# Patient Record
Sex: FEMALE | Race: WHITE | Employment: FULL TIME | URBAN - METROPOLITAN AREA
[De-identification: names, ages, dates, MRNs, and addresses within clinical notes are randomized per-mention and may not be internally consistent; named-entity substitution may affect disease eponyms.]

---

## 2017-09-29 ENCOUNTER — OFFICE VISIT (OUTPATIENT)
Dept: FAMILY MEDICINE CLINIC | Age: 32
End: 2017-09-29

## 2017-09-29 VITALS
DIASTOLIC BLOOD PRESSURE: 70 MMHG | WEIGHT: 179.9 LBS | HEART RATE: 112 BPM | BODY MASS INDEX: 29.97 KG/M2 | OXYGEN SATURATION: 99 % | HEIGHT: 65 IN | SYSTOLIC BLOOD PRESSURE: 110 MMHG

## 2017-09-29 DIAGNOSIS — J45.909 REACTIVE AIRWAY DISEASE WITHOUT COMPLICATION: ICD-10-CM

## 2017-09-29 DIAGNOSIS — Z00.00 WELL ADULT EXAM: Primary | ICD-10-CM

## 2017-09-29 PROCEDURE — 99395 PREV VISIT EST AGE 18-39: CPT | Performed by: FAMILY MEDICINE

## 2017-09-29 RX ORDER — ALBUTEROL SULFATE 2.5 MG/3ML
2.5 SOLUTION RESPIRATORY (INHALATION)
COMMUNITY
Start: 2015-02-17

## 2017-09-29 RX ORDER — ALBUTEROL SULFATE 90 UG/1
2-4 AEROSOL, METERED RESPIRATORY (INHALATION)
COMMUNITY
Start: 2016-07-06

## 2017-09-29 ASSESSMENT — PATIENT HEALTH QUESTIONNAIRE - PHQ9
SUM OF ALL RESPONSES TO PHQ9 QUESTIONS 1 & 2: 0
1. LITTLE INTEREST OR PLEASURE IN DOING THINGS: 0
SUM OF ALL RESPONSES TO PHQ QUESTIONS 1-9: 0
2. FEELING DOWN, DEPRESSED OR HOPELESS: 0

## 2017-10-07 ENCOUNTER — HOSPITAL ENCOUNTER (OUTPATIENT)
Dept: OTHER | Age: 32
Discharge: HOME | End: 2017-10-07
Attending: FAMILY MEDICINE | Admitting: FAMILY MEDICINE

## 2017-10-07 LAB
A/G RATIO: 1.5 (ref 1.1–2.2)
ALBUMIN SERPL-MCNC: 4.1 G/DL (ref 3.4–5)
ALP BLD-CCNC: 50 U/L (ref 40–129)
ALT SERPL-CCNC: 8 U/L (ref 10–40)
ANION GAP SERPL CALCULATED.3IONS-SCNC: 14 MMOL/L (ref 3–16)
AST SERPL-CCNC: 14 U/L (ref 15–37)
BASOPHILS ABSOLUTE: 0.1 K/UL (ref 0–0.2)
BASOPHILS RELATIVE PERCENT: 1 %
BILIRUB SERPL-MCNC: 0.3 MG/DL (ref 0–1)
BUN BLDV-MCNC: 12 MG/DL (ref 7–20)
CALCIUM SERPL-MCNC: 9 MG/DL (ref 8.3–10.6)
CHLORIDE BLD-SCNC: 105 MMOL/L (ref 99–110)
CHOLESTEROL, TOTAL: 190 MG/DL (ref 0–199)
CO2: 23 MMOL/L (ref 21–32)
CREAT SERPL-MCNC: 0.8 MG/DL (ref 0.6–1.1)
EOSINOPHILS ABSOLUTE: 0.5 K/UL (ref 0–0.6)
EOSINOPHILS RELATIVE PERCENT: 9.4 %
GFR AFRICAN AMERICAN: >60
GFR NON-AFRICAN AMERICAN: >60
GLOBULIN: 2.7 G/DL
GLUCOSE BLD-MCNC: 85 MG/DL (ref 70–99)
HCT VFR BLD CALC: 34.3 % (ref 36–48)
HDLC SERPL-MCNC: 75 MG/DL (ref 40–60)
HEMOGLOBIN: 11.5 G/DL (ref 12–16)
LDL CHOLESTEROL CALCULATED: 101 MG/DL
LYMPHOCYTES ABSOLUTE: 2 K/UL (ref 1–5.1)
LYMPHOCYTES RELATIVE PERCENT: 36.5 %
MCH RBC QN AUTO: 29.8 PG (ref 26–34)
MCHC RBC AUTO-ENTMCNC: 33.5 G/DL (ref 31–36)
MCV RBC AUTO: 89.1 FL (ref 80–100)
MONOCYTES ABSOLUTE: 0.4 K/UL (ref 0–1.3)
MONOCYTES RELATIVE PERCENT: 6.7 %
NEUTROPHILS ABSOLUTE: 2.5 K/UL (ref 1.7–7.7)
NEUTROPHILS RELATIVE PERCENT: 46.4 %
PDW BLD-RTO: 14.6 % (ref 12.4–15.4)
PLATELET # BLD: 496 K/UL (ref 135–450)
PMV BLD AUTO: 7.5 FL (ref 5–10.5)
POTASSIUM SERPL-SCNC: 4.6 MMOL/L (ref 3.5–5.1)
RBC # BLD: 3.86 M/UL (ref 4–5.2)
SODIUM BLD-SCNC: 142 MMOL/L (ref 136–145)
TOTAL PROTEIN: 6.8 G/DL (ref 6.4–8.2)
TRIGL SERPL-MCNC: 71 MG/DL (ref 0–150)
TSH SERPL DL<=0.05 MIU/L-ACNC: 2.58 UIU/ML (ref 0.27–4.2)
VLDLC SERPL CALC-MCNC: 14 MG/DL
WBC # BLD: 5.4 K/UL (ref 4–11)

## 2017-12-07 ENCOUNTER — TELEPHONE (OUTPATIENT)
Dept: FAMILY MEDICINE CLINIC | Age: 32
End: 2017-12-07

## 2017-12-13 ENCOUNTER — OFFICE VISIT (OUTPATIENT)
Dept: FAMILY MEDICINE CLINIC | Age: 32
End: 2017-12-13

## 2017-12-13 VITALS
BODY MASS INDEX: 30.74 KG/M2 | SYSTOLIC BLOOD PRESSURE: 110 MMHG | OXYGEN SATURATION: 98 % | DIASTOLIC BLOOD PRESSURE: 70 MMHG | WEIGHT: 184.5 LBS | HEIGHT: 65 IN | HEART RATE: 122 BPM

## 2017-12-13 DIAGNOSIS — M54.2 NECK PAIN: ICD-10-CM

## 2017-12-13 DIAGNOSIS — D75.839 THROMBOCYTOSIS: Primary | ICD-10-CM

## 2017-12-13 DIAGNOSIS — R51.9 SEVERE HEADACHE: ICD-10-CM

## 2017-12-13 PROCEDURE — 99213 OFFICE O/P EST LOW 20 MIN: CPT | Performed by: FAMILY MEDICINE

## 2017-12-13 RX ORDER — CLONAZEPAM 0.5 MG/1
0.5 TABLET ORAL
COMMUNITY
Start: 2017-05-26 | End: 2019-06-25

## 2017-12-13 ASSESSMENT — ENCOUNTER SYMPTOMS
SINUS PRESSURE: 0
BACK PAIN: 0
VISUAL CHANGE: 0
COUGH: 0
TROUBLE SWALLOWING: 0
BLURRED VISION: 0
FACIAL SWEATING: 0
NAUSEA: 0
SORE THROAT: 0
EYE PAIN: 0
ABDOMINAL PAIN: 0
RHINORRHEA: 0
EYE REDNESS: 0
SCALP TENDERNESS: 0
VOMITING: 0
SWOLLEN GLANDS: 0
PHOTOPHOBIA: 0
EYE WATERING: 0

## 2017-12-13 NOTE — PROGRESS NOTES
relief. Review of Systems   Constitutional: Negative for fever and weight loss. HENT: Negative for ear pain, hearing loss, rhinorrhea, sinus pressure, sore throat, tinnitus and trouble swallowing. Eyes: Negative for blurred vision, photophobia, pain and redness. Respiratory: Negative for cough. Cardiovascular: Negative for chest pain and syncope. Gastrointestinal: Negative for abdominal pain, nausea and vomiting. Musculoskeletal: Positive for neck pain. Negative for back pain. Neurological: Positive for headaches. Negative for dizziness, tingling, seizures, weakness, numbness and loss of balance. Psychiatric/Behavioral: The patient does not have insomnia. Objective:   Physical Exam   Constitutional: She is oriented to person, place, and time. She appears well-developed and well-nourished. No distress. HENT:   Mouth/Throat: Oropharynx is clear and moist.   Eyes: Conjunctivae are normal. Pupils are equal, round, and reactive to light. Neck: No JVD present. No tracheal deviation present. No thyromegaly present. Cardiovascular: Normal rate, regular rhythm, normal heart sounds and intact distal pulses. Exam reveals no gallop. No murmur heard. Pulmonary/Chest: Effort normal and breath sounds normal. No stridor. No respiratory distress. She has no wheezes. She has no rales. She exhibits no tenderness. Abdominal: Soft. Bowel sounds are normal. She exhibits no distension and no mass. There is no tenderness. Musculoskeletal: She exhibits no edema or tenderness. Lymphadenopathy:     She has no cervical adenopathy. Neurological: She is alert and oriented to person, place, and time. She displays normal reflexes. No cranial nerve deficit. She exhibits normal muscle tone. Coordination normal.   Skin: Skin is warm and dry. No rash noted. No erythema. No pallor. Psychiatric: She has a normal mood and affect.  Her behavior is normal. Judgment and thought content normal.   Vitals

## 2017-12-14 ENCOUNTER — HOSPITAL ENCOUNTER (OUTPATIENT)
Dept: OTHER | Age: 32
Discharge: HOME | End: 2017-12-14
Attending: FAMILY MEDICINE | Admitting: FAMILY MEDICINE

## 2017-12-14 DIAGNOSIS — R51.9 SEVERE HEADACHE: ICD-10-CM

## 2017-12-14 DIAGNOSIS — M54.2 NECK PAIN: ICD-10-CM

## 2017-12-14 LAB
BASOPHILS ABSOLUTE: 0.1 K/UL (ref 0–0.2)
BASOPHILS RELATIVE PERCENT: 1 %
EOSINOPHILS ABSOLUTE: 0.7 K/UL (ref 0–0.6)
EOSINOPHILS RELATIVE PERCENT: 8.4 %
HCT VFR BLD CALC: 36.5 % (ref 36–48)
HEMOGLOBIN: 12 G/DL (ref 12–16)
LYMPHOCYTES ABSOLUTE: 2.5 K/UL (ref 1–5.1)
LYMPHOCYTES RELATIVE PERCENT: 27.8 %
MCH RBC QN AUTO: 27.3 PG (ref 26–34)
MCHC RBC AUTO-ENTMCNC: 32.8 G/DL (ref 31–36)
MCV RBC AUTO: 83.4 FL (ref 80–100)
MONOCYTES ABSOLUTE: 0.6 K/UL (ref 0–1.3)
MONOCYTES RELATIVE PERCENT: 6.5 %
NEUTROPHILS ABSOLUTE: 5 K/UL (ref 1.7–7.7)
NEUTROPHILS RELATIVE PERCENT: 56.3 %
PDW BLD-RTO: 14.9 % (ref 12.4–15.4)
PLATELET # BLD: 525 K/UL (ref 135–450)
PMV BLD AUTO: 7.8 FL (ref 5–10.5)
RBC # BLD: 4.38 M/UL (ref 4–5.2)
WBC # BLD: 8.9 K/UL (ref 4–11)

## 2018-03-09 ENCOUNTER — EMPLOYEE WELLNESS (OUTPATIENT)
Dept: OTHER | Age: 33
End: 2018-03-09

## 2018-04-12 PROBLEM — Z00.00 WELL ADULT EXAM: Status: RESOLVED | Noted: 2017-09-29 | Resolved: 2018-04-12

## 2018-04-16 VITALS — BODY MASS INDEX: 30.62 KG/M2 | WEIGHT: 184 LBS

## 2018-05-18 ENCOUNTER — OFFICE VISIT (OUTPATIENT)
Dept: FAMILY MEDICINE CLINIC | Age: 33
End: 2018-05-18

## 2018-05-18 VITALS
WEIGHT: 186.1 LBS | BODY MASS INDEX: 31 KG/M2 | DIASTOLIC BLOOD PRESSURE: 80 MMHG | OXYGEN SATURATION: 98 % | HEIGHT: 65 IN | HEART RATE: 113 BPM | SYSTOLIC BLOOD PRESSURE: 110 MMHG

## 2018-05-18 DIAGNOSIS — M54.2 NECK PAIN: ICD-10-CM

## 2018-05-18 DIAGNOSIS — E55.9 VITAMIN D DEFICIENCY: Primary | ICD-10-CM

## 2018-05-18 DIAGNOSIS — R59.1 LYMPHADENOPATHY OF HEAD AND NECK: ICD-10-CM

## 2018-05-18 DIAGNOSIS — D75.839 THROMBOCYTOSIS: ICD-10-CM

## 2018-05-18 DIAGNOSIS — R51.9 SEVERE HEADACHE: ICD-10-CM

## 2018-05-18 DIAGNOSIS — R20.0 BILATERAL HAND NUMBNESS: ICD-10-CM

## 2018-05-18 PROCEDURE — G8427 DOCREV CUR MEDS BY ELIG CLIN: HCPCS | Performed by: FAMILY MEDICINE

## 2018-05-18 PROCEDURE — G8417 CALC BMI ABV UP PARAM F/U: HCPCS | Performed by: FAMILY MEDICINE

## 2018-05-18 PROCEDURE — 1036F TOBACCO NON-USER: CPT | Performed by: FAMILY MEDICINE

## 2018-05-18 PROCEDURE — 99214 OFFICE O/P EST MOD 30 MIN: CPT | Performed by: FAMILY MEDICINE

## 2018-05-18 RX ORDER — NAPROXEN 500 MG/1
500 TABLET ORAL 2 TIMES DAILY WITH MEALS
COMMUNITY
End: 2019-08-05 | Stop reason: CLARIF

## 2018-05-18 ASSESSMENT — ENCOUNTER SYMPTOMS
TROUBLE SWALLOWING: 0
VISUAL CHANGE: 0
PHOTOPHOBIA: 0

## 2018-05-19 ENCOUNTER — HOSPITAL ENCOUNTER (OUTPATIENT)
Dept: OTHER | Age: 33
Discharge: HOME | End: 2018-05-19
Attending: FAMILY MEDICINE | Admitting: FAMILY MEDICINE

## 2018-05-19 LAB
A/G RATIO: 1.7 (ref 1.1–2.2)
ALBUMIN SERPL-MCNC: 4.3 G/DL (ref 3.4–5)
ALP BLD-CCNC: 48 U/L (ref 40–129)
ALT SERPL-CCNC: 10 U/L (ref 10–40)
ANION GAP SERPL CALCULATED.3IONS-SCNC: 14 MMOL/L (ref 3–16)
AST SERPL-CCNC: 17 U/L (ref 15–37)
BASOPHILS ABSOLUTE: 0.1 K/UL (ref 0–0.2)
BASOPHILS RELATIVE PERCENT: 1.5 %
BILIRUB SERPL-MCNC: 0.3 MG/DL (ref 0–1)
BUN BLDV-MCNC: 11 MG/DL (ref 7–20)
CALCIUM SERPL-MCNC: 9 MG/DL (ref 8.3–10.6)
CHLORIDE BLD-SCNC: 104 MMOL/L (ref 99–110)
CO2: 22 MMOL/L (ref 21–32)
CREAT SERPL-MCNC: 0.8 MG/DL (ref 0.6–1.1)
EOSINOPHILS ABSOLUTE: 0.6 K/UL (ref 0–0.6)
EOSINOPHILS RELATIVE PERCENT: 7.9 %
GFR AFRICAN AMERICAN: >60
GFR NON-AFRICAN AMERICAN: >60
GLOBULIN: 2.6 G/DL
GLUCOSE BLD-MCNC: 89 MG/DL (ref 70–99)
HCT VFR BLD CALC: 38.9 % (ref 36–48)
HEMOGLOBIN: 13.2 G/DL (ref 12–16)
LYMPHOCYTES ABSOLUTE: 2 K/UL (ref 1–5.1)
LYMPHOCYTES RELATIVE PERCENT: 27 %
MCH RBC QN AUTO: 28.3 PG (ref 26–34)
MCHC RBC AUTO-ENTMCNC: 33.8 G/DL (ref 31–36)
MCV RBC AUTO: 83.8 FL (ref 80–100)
MONOCYTES ABSOLUTE: 0.4 K/UL (ref 0–1.3)
MONOCYTES RELATIVE PERCENT: 5.9 %
NEUTROPHILS ABSOLUTE: 4.4 K/UL (ref 1.7–7.7)
NEUTROPHILS RELATIVE PERCENT: 57.7 %
PDW BLD-RTO: 15.4 % (ref 12.4–15.4)
PLATELET # BLD: 461 K/UL (ref 135–450)
PMV BLD AUTO: 7.7 FL (ref 5–10.5)
POTASSIUM SERPL-SCNC: 4.3 MMOL/L (ref 3.5–5.1)
RBC # BLD: 4.65 M/UL (ref 4–5.2)
SODIUM BLD-SCNC: 140 MMOL/L (ref 136–145)
TOTAL PROTEIN: 6.9 G/DL (ref 6.4–8.2)
TSH SERPL DL<=0.05 MIU/L-ACNC: 2.7 UIU/ML (ref 0.27–4.2)
VITAMIN D 25-HYDROXY: 21 NG/ML
WBC # BLD: 7.6 K/UL (ref 4–11)

## 2018-05-22 DIAGNOSIS — R51.9 SEVERE HEADACHE: ICD-10-CM

## 2018-05-22 DIAGNOSIS — M54.2 NECK PAIN: Primary | ICD-10-CM

## 2018-05-22 DIAGNOSIS — R20.0 BILATERAL HAND NUMBNESS: ICD-10-CM

## 2018-05-30 ENCOUNTER — HOSPITAL ENCOUNTER (OUTPATIENT)
Dept: MRI IMAGING | Age: 33
Discharge: HOME | End: 2018-05-30

## 2018-05-30 DIAGNOSIS — R51.9 SEVERE HEADACHE: ICD-10-CM

## 2018-05-30 DIAGNOSIS — M54.2 NECK PAIN: ICD-10-CM

## 2018-05-30 DIAGNOSIS — R20.0 BILATERAL HAND NUMBNESS: ICD-10-CM

## 2018-07-03 ENCOUNTER — OFFICE VISIT (OUTPATIENT)
Dept: NEUROLOGY | Age: 33
End: 2018-07-03

## 2018-07-03 VITALS
WEIGHT: 192 LBS | HEART RATE: 86 BPM | DIASTOLIC BLOOD PRESSURE: 80 MMHG | SYSTOLIC BLOOD PRESSURE: 116 MMHG | HEIGHT: 65 IN | BODY MASS INDEX: 31.99 KG/M2

## 2018-07-03 DIAGNOSIS — R20.0 BILATERAL HAND NUMBNESS: ICD-10-CM

## 2018-07-03 DIAGNOSIS — M54.81 CERVICO-OCCIPITAL NEURALGIA OF LEFT SIDE: Primary | ICD-10-CM

## 2018-07-03 PROCEDURE — G8417 CALC BMI ABV UP PARAM F/U: HCPCS | Performed by: PSYCHIATRY & NEUROLOGY

## 2018-07-03 PROCEDURE — G8427 DOCREV CUR MEDS BY ELIG CLIN: HCPCS | Performed by: PSYCHIATRY & NEUROLOGY

## 2018-07-03 PROCEDURE — 99244 OFF/OP CNSLTJ NEW/EST MOD 40: CPT | Performed by: PSYCHIATRY & NEUROLOGY

## 2018-07-03 RX ORDER — AMITRIPTYLINE HYDROCHLORIDE 10 MG/1
TABLET, FILM COATED ORAL
Qty: 60 TABLET | Refills: 0 | Status: SHIPPED | OUTPATIENT
Start: 2018-07-03 | End: 2018-08-02 | Stop reason: SDUPTHER

## 2018-07-03 NOTE — LETTER
[facility]  [address] [address]  Phone: [phone]  Fax: [phone]    Marlen Mccartney MD        July 3, 2018       Patient: Enriqueta Oconnellain   MR Number: [id number]   YOB: 1985   Date of Visit: 7/3/2018       Dear Dr. Bishop Joiner:    Thank you for the request for consultation for Penelope Ariza to me for the evaluation of Headaches and neck pain. Below are the relevant portions of my assessment and plan of care. NEUROLOGY CONSULTATION     Chief Complaint   Patient presents with    Headache     Patient is here today to establish care for severe headaches. Patient has been getting bad headaches since last October. HISTORY OF PRESENT ILLNESS :    Penelope Ariza is a 35 y.o. female who is referred by Dr. Bishop Joiner   History was obtained from patient And her . Patient was referred for evaluation of neck pain and headaches. Patient states that she has had them since last October 2017. She constantly feels a pressure in the back of the head especially around the occipital region on the left side. Sometimes the pain would come to the front also. Loud noise can sometimes make it feel worse. Patient now has constant dizziness over the last few weeks and feels unsteady  She also has nausea with severe attacks. Patient states that she has had migraine headaches in the past but these feel different. Patient also complains of tingling and numbness in both her arms. She keeps dropping objects from her hands.   MRI brain was done which showed the minimal ethmoid sinus disease but was otherwise normal.  MRI cervical spine was done which showed minimal foraminal narrowing but was essentially normal without any nerve impingement      REVIEW OF SYSTEMS    Constitutional:  []   Chills   [x]  Fatigue   []  Fevers   []  Malaise   []  Weight loss

## 2018-07-03 NOTE — PATIENT INSTRUCTIONS
Please call with any questions or concerns:   [facility]  @ [phone]. LAB RESULTS:  Please obtain any labs or diagnostic tests as discussed today. You should call the office to check the results. Please allow  3 to 7 days for us to get these results. MEDICATION LIST:  Please bring an accurate list of your medications to every visit. APPOINTMENT CONFIRMATION:  We will call you the day before your scheduled appointment to confirm. If we are unable to reach you, you MUST call back by the end of the day to confirm the appointment or we may be forced to cancel.

## 2018-07-03 NOTE — PROGRESS NOTES
above    Gastrointestinal:   []  Abdominal pain   []  Constipation    []  Diarrhea    []   Dysphagia                      [x] Denies all of the above    Genitourinary:      []  Frequency   []  Hematuria     []  Urinary incontinence           [x] Denies all of the above     Hematologic/lymphatic:  []  Bleeding    []  Easy bruising   [x]  Anemia  [] Denies all of the above     Musculoskeletal:   [x] Back pain       [x]  Myalgias    [x]  Neck pain           [] Denies all of the above    Neurological: As noted in HPI    Behavioral/Psych:   [x] Anxiety    []  Depression     []  Mood swings     [] Denies all of the above     Endocrine:   []  Temperature intolerance     [x] Fatigue      [] Denies all of the above     Allergic/Immunologic:   [] Hay fever    [x] Denies all of the above     Past Medical History:   Diagnosis Date    Reactive airway disease without complication 8/85/7234     Family History   Problem Relation Age of Onset    Depression Mother     Heart Disease Father     Stroke Father      Social History     Social History    Marital status: Legally      Spouse name: N/A    Number of children: 3    Years of education: N/A     Occupational History    [employer]--pre [employer]      Social History Main Topics    Smoking status: Never Smoker    Smokeless tobacco: Never Used    Alcohol use 0.6 oz/week     1 Glasses of wine per week    Drug use: No    Sexual activity: Yes     Partners: Male     Other Topics Concern    None     Social History Narrative    --raising 3 by self--ex helps    Gym 4 x week    Works 40--       PHYSICAL EXAMINATION:  /80   Pulse 86   Ht 5' 5\" (1.651 m)   Wt 192 lb (87.1 kg)   BMI 31.95 kg/m²   Appearance: Well appearing, well nourished and in no distress  Mental Status Exam: Patient is alert, oriented to person, place and time.    Recent and remote memory is normal  Fund of Knowledge is normal  Attention/concentration is normal.   Speech : No dysarthria  Language have mononeuropathy in both arms causing the tingling numbness in her hands  MRI brain images were normal  MRI cervical spine showed minimal bone spurring which is probably not significant        RECOMMENDATIONS :  Discussed with patient and her   Trial of amitriptyline at a low-dose  Side effects including sedation were discussed  I will see her back in a month and at that time do EMG and nerve conduction studies of both upper extremities to look for any mononeuropathy  Thank you for this consultation        Please note a portion of this chart was generated using dragon dictation software. Although every effort was made to ensure the accuracy of this automated transcription, some errors in transcription may have occurred.

## 2018-08-02 ENCOUNTER — OFFICE VISIT (OUTPATIENT)
Dept: NEUROLOGY | Age: 33
End: 2018-08-02

## 2018-08-02 ENCOUNTER — PROCEDURE VISIT (OUTPATIENT)
Dept: NEUROLOGY | Age: 33
End: 2018-08-02

## 2018-08-02 VITALS
HEIGHT: 65 IN | HEART RATE: 77 BPM | BODY MASS INDEX: 31.65 KG/M2 | SYSTOLIC BLOOD PRESSURE: 113 MMHG | DIASTOLIC BLOOD PRESSURE: 78 MMHG | WEIGHT: 190 LBS

## 2018-08-02 DIAGNOSIS — R20.0 NUMBNESS AND TINGLING: Primary | ICD-10-CM

## 2018-08-02 DIAGNOSIS — M54.81 CERVICO-OCCIPITAL NEURALGIA OF LEFT SIDE: Primary | ICD-10-CM

## 2018-08-02 DIAGNOSIS — G56.23 ULNAR NEUROPATHY OF BOTH UPPER EXTREMITIES: ICD-10-CM

## 2018-08-02 DIAGNOSIS — R20.2 NUMBNESS AND TINGLING: Primary | ICD-10-CM

## 2018-08-02 PROCEDURE — 95911 NRV CNDJ TEST 9-10 STUDIES: CPT | Performed by: PSYCHIATRY & NEUROLOGY

## 2018-08-02 PROCEDURE — 95886 MUSC TEST DONE W/N TEST COMP: CPT | Performed by: PSYCHIATRY & NEUROLOGY

## 2018-08-02 PROCEDURE — G8427 DOCREV CUR MEDS BY ELIG CLIN: HCPCS | Performed by: PSYCHIATRY & NEUROLOGY

## 2018-08-02 PROCEDURE — G8417 CALC BMI ABV UP PARAM F/U: HCPCS | Performed by: PSYCHIATRY & NEUROLOGY

## 2018-08-02 PROCEDURE — 99213 OFFICE O/P EST LOW 20 MIN: CPT | Performed by: PSYCHIATRY & NEUROLOGY

## 2018-08-02 PROCEDURE — 1036F TOBACCO NON-USER: CPT | Performed by: PSYCHIATRY & NEUROLOGY

## 2018-08-02 RX ORDER — AMITRIPTYLINE HYDROCHLORIDE 10 MG/1
TABLET, FILM COATED ORAL
Qty: 180 TABLET | Refills: 0 | Status: SHIPPED | OUTPATIENT
Start: 2018-08-02 | End: 2018-10-02 | Stop reason: SDUPTHER

## 2018-08-02 NOTE — PROGRESS NOTES
symmetrical strength. Deep tendon reflexes normal. Plantar reflexes downgoing. Sensory exam normal. Coordination normal. Gait normal. No carotid bruit. No neck stiffness. Data :  LABS:  General Labs:    CBC:   Lab Results   Component Value Date    WBC 7.6 05/19/2018    RBC 4.65 05/19/2018    HGB 13.2 05/19/2018    HCT 38.9 05/19/2018    MCV 83.8 05/19/2018    MCH 28.3 05/19/2018    MCHC 33.8 05/19/2018    RDW 15.4 05/19/2018     05/19/2018    MPV 7.7 05/19/2018     BMP:    Lab Results   Component Value Date     05/19/2018    K 4.3 05/19/2018     05/19/2018    CO2 22 05/19/2018    BUN 11 05/19/2018    LABALBU 4.3 05/19/2018    CREATININE 0.8 05/19/2018    CALCIUM 9.0 05/19/2018    GFRAA >60 05/19/2018    LABGLOM >60 05/19/2018    GLUCOSE 89 05/19/2018     RADIOLOGY REVIEW:  I have reviewed radiology image(s) and reports(s) of:  MRI brain and cervical spine    Impression :  EMG and nerve conduction studies confirmed bilateral ulnar nerve lesions at the elbow. Left side was worse than the right side. Patient probably also has left occipital neuralgia. There is tenderness over the left greater occipital nerve. Plan :  Discussed with patient  Explained EMG findings  Continue conservative management for now  She will continue amitriptyline for the left occipital neuralgia  I will see her back in 2 months and decide if she needs an occipital nerve block        Please note a portion of  this chart was generated using dragon dictation software. Although every effort was made to ensure the accuracy of this automated transcription, some errors in transcription may have occurred.

## 2018-10-02 ENCOUNTER — OFFICE VISIT (OUTPATIENT)
Dept: NEUROLOGY | Age: 33
End: 2018-10-02
Payer: COMMERCIAL

## 2018-10-02 VITALS
WEIGHT: 196 LBS | HEIGHT: 65 IN | DIASTOLIC BLOOD PRESSURE: 76 MMHG | BODY MASS INDEX: 32.65 KG/M2 | SYSTOLIC BLOOD PRESSURE: 119 MMHG | HEART RATE: 111 BPM

## 2018-10-02 DIAGNOSIS — M54.81 CERVICO-OCCIPITAL NEURALGIA OF LEFT SIDE: ICD-10-CM

## 2018-10-02 DIAGNOSIS — G56.23 ULNAR NEUROPATHY OF BOTH UPPER EXTREMITIES: Primary | ICD-10-CM

## 2018-10-02 PROCEDURE — G8417 CALC BMI ABV UP PARAM F/U: HCPCS | Performed by: PSYCHIATRY & NEUROLOGY

## 2018-10-02 PROCEDURE — G8484 FLU IMMUNIZE NO ADMIN: HCPCS | Performed by: PSYCHIATRY & NEUROLOGY

## 2018-10-02 PROCEDURE — G8427 DOCREV CUR MEDS BY ELIG CLIN: HCPCS | Performed by: PSYCHIATRY & NEUROLOGY

## 2018-10-02 PROCEDURE — 99213 OFFICE O/P EST LOW 20 MIN: CPT | Performed by: PSYCHIATRY & NEUROLOGY

## 2018-10-02 PROCEDURE — 1036F TOBACCO NON-USER: CPT | Performed by: PSYCHIATRY & NEUROLOGY

## 2018-10-02 RX ORDER — AMITRIPTYLINE HYDROCHLORIDE 10 MG/1
TABLET, FILM COATED ORAL
Qty: 180 TABLET | Refills: 0 | Status: SHIPPED | OUTPATIENT
Start: 2018-10-02 | End: 2019-02-07 | Stop reason: SDUPTHER

## 2018-12-28 ENCOUNTER — NURSE TRIAGE (OUTPATIENT)
Dept: OTHER | Facility: CLINIC | Age: 33
End: 2018-12-28

## 2019-01-11 ENCOUNTER — HOSPITAL ENCOUNTER (OUTPATIENT)
Dept: GENERAL RADIOLOGY | Age: 34
Discharge: HOME | End: 2019-01-11
Payer: COMMERCIAL

## 2019-01-11 ENCOUNTER — OFFICE VISIT (OUTPATIENT)
Dept: FAMILY MEDICINE CLINIC | Age: 34
End: 2019-01-11
Payer: COMMERCIAL

## 2019-01-11 ENCOUNTER — HOSPITAL ENCOUNTER (OUTPATIENT)
Age: 34
Discharge: HOME | End: 2019-01-11
Payer: COMMERCIAL

## 2019-01-11 VITALS
DIASTOLIC BLOOD PRESSURE: 82 MMHG | SYSTOLIC BLOOD PRESSURE: 122 MMHG | BODY MASS INDEX: 32.78 KG/M2 | WEIGHT: 197 LBS | HEART RATE: 97 BPM | OXYGEN SATURATION: 98 %

## 2019-01-11 DIAGNOSIS — M25.551 PAIN OF BOTH HIP JOINTS: ICD-10-CM

## 2019-01-11 DIAGNOSIS — M25.552 PAIN OF BOTH HIP JOINTS: ICD-10-CM

## 2019-01-11 DIAGNOSIS — J45.20 MILD INTERMITTENT REACTIVE AIRWAY DISEASE WITHOUT COMPLICATION: ICD-10-CM

## 2019-01-11 PROCEDURE — 99213 OFFICE O/P EST LOW 20 MIN: CPT | Performed by: FAMILY MEDICINE

## 2019-01-11 PROCEDURE — 73522 X-RAY EXAM HIPS BI 3-4 VIEWS: CPT

## 2019-01-11 RX ORDER — BUDESONIDE AND FORMOTEROL FUMARATE DIHYDRATE 160; 4.5 UG/1; UG/1
2 AEROSOL RESPIRATORY (INHALATION) 2 TIMES DAILY
Qty: 1 INHALER | Refills: 5 | COMMUNITY
Start: 2019-01-11 | End: 2020-10-07

## 2019-01-11 RX ORDER — OMEPRAZOLE 20 MG/1
20 CAPSULE, DELAYED RELEASE ORAL
Qty: 30 CAPSULE | Refills: 3 | Status: SHIPPED | OUTPATIENT
Start: 2019-01-11

## 2019-01-11 ASSESSMENT — PATIENT HEALTH QUESTIONNAIRE - PHQ9
SUM OF ALL RESPONSES TO PHQ9 QUESTIONS 1 & 2: 0
2. FEELING DOWN, DEPRESSED OR HOPELESS: 0
SUM OF ALL RESPONSES TO PHQ QUESTIONS 1-9: 0
1. LITTLE INTEREST OR PLEASURE IN DOING THINGS: 0
SUM OF ALL RESPONSES TO PHQ QUESTIONS 1-9: 0

## 2019-01-11 ASSESSMENT — ENCOUNTER SYMPTOMS
WHEEZING: 0
ABDOMINAL PAIN: 0
SWOLLEN GLANDS: 0
HEMOPTYSIS: 0
SPUTUM PRODUCTION: 0
SHORTNESS OF BREATH: 1
SORE THROAT: 0
VOMITING: 0
RHINORRHEA: 0
ORTHOPNEA: 0

## 2019-01-17 ENCOUNTER — PATIENT MESSAGE (OUTPATIENT)
Dept: FAMILY MEDICINE CLINIC | Age: 34
End: 2019-01-17

## 2019-01-17 DIAGNOSIS — R59.1 LYMPHADENOPATHY OF HEAD AND NECK: ICD-10-CM

## 2019-01-17 DIAGNOSIS — M25.532 LEFT WRIST PAIN: Primary | ICD-10-CM

## 2019-01-17 DIAGNOSIS — M54.2 NECK PAIN: ICD-10-CM

## 2019-01-17 DIAGNOSIS — D75.839 THROMBOCYTOSIS: ICD-10-CM

## 2019-01-17 DIAGNOSIS — M25.551 PAIN OF BOTH HIP JOINTS: ICD-10-CM

## 2019-01-17 DIAGNOSIS — R20.0 BILATERAL HAND NUMBNESS: ICD-10-CM

## 2019-01-17 DIAGNOSIS — M25.552 PAIN OF BOTH HIP JOINTS: ICD-10-CM

## 2019-02-05 ENCOUNTER — OFFICE VISIT (OUTPATIENT)
Dept: RHEUMATOLOGY | Age: 34
End: 2019-02-05
Payer: COMMERCIAL

## 2019-02-05 VITALS
BODY MASS INDEX: 33.66 KG/M2 | TEMPERATURE: 98.4 F | DIASTOLIC BLOOD PRESSURE: 74 MMHG | WEIGHT: 202 LBS | HEIGHT: 65 IN | SYSTOLIC BLOOD PRESSURE: 108 MMHG | HEART RATE: 76 BPM

## 2019-02-05 DIAGNOSIS — M70.61 TROCHANTERIC BURSITIS OF BOTH HIPS: Primary | ICD-10-CM

## 2019-02-05 DIAGNOSIS — R20.2 PARESTHESIA: ICD-10-CM

## 2019-02-05 DIAGNOSIS — M70.62 TROCHANTERIC BURSITIS OF BOTH HIPS: Primary | ICD-10-CM

## 2019-02-05 PROCEDURE — 99244 OFF/OP CNSLTJ NEW/EST MOD 40: CPT | Performed by: INTERNAL MEDICINE

## 2019-02-07 ENCOUNTER — PROCEDURE VISIT (OUTPATIENT)
Dept: NEUROLOGY | Age: 34
End: 2019-02-07
Payer: COMMERCIAL

## 2019-02-07 ENCOUNTER — OFFICE VISIT (OUTPATIENT)
Dept: NEUROLOGY | Age: 34
End: 2019-02-07
Payer: COMMERCIAL

## 2019-02-07 VITALS
DIASTOLIC BLOOD PRESSURE: 88 MMHG | BODY MASS INDEX: 33.66 KG/M2 | WEIGHT: 202 LBS | SYSTOLIC BLOOD PRESSURE: 133 MMHG | HEART RATE: 103 BPM | HEIGHT: 65 IN

## 2019-02-07 DIAGNOSIS — M54.81 CERVICO-OCCIPITAL NEURALGIA OF LEFT SIDE: ICD-10-CM

## 2019-02-07 DIAGNOSIS — G56.23 ULNAR NEUROPATHY OF BOTH UPPER EXTREMITIES: Primary | ICD-10-CM

## 2019-02-07 DIAGNOSIS — R20.0 NUMBNESS AND TINGLING: Primary | ICD-10-CM

## 2019-02-07 DIAGNOSIS — R20.2 NUMBNESS AND TINGLING: Primary | ICD-10-CM

## 2019-02-07 PROCEDURE — 95911 NRV CNDJ TEST 9-10 STUDIES: CPT | Performed by: PSYCHIATRY & NEUROLOGY

## 2019-02-07 PROCEDURE — 99213 OFFICE O/P EST LOW 20 MIN: CPT | Performed by: PSYCHIATRY & NEUROLOGY

## 2019-02-07 PROCEDURE — 95886 MUSC TEST DONE W/N TEST COMP: CPT | Performed by: PSYCHIATRY & NEUROLOGY

## 2019-02-07 RX ORDER — AMITRIPTYLINE HYDROCHLORIDE 10 MG/1
TABLET, FILM COATED ORAL
Qty: 180 TABLET | Refills: 1 | Status: SHIPPED | OUTPATIENT
Start: 2019-02-07 | End: 2020-10-07

## 2019-05-06 LAB
HPV COMMENT: NORMAL
HPV TYPE 16: NOT DETECTED
HPV TYPE 18: NOT DETECTED
HPVOH (OTHER TYPES): NOT DETECTED

## 2019-05-16 ENCOUNTER — EMPLOYEE WELLNESS (OUTPATIENT)
Dept: OTHER | Age: 34
End: 2019-05-16

## 2019-05-31 ENCOUNTER — TELEPHONE (OUTPATIENT)
Dept: FAMILY MEDICINE CLINIC | Age: 34
End: 2019-05-31

## 2019-06-03 VITALS — BODY MASS INDEX: 33.28 KG/M2 | WEIGHT: 200 LBS

## 2019-06-05 ENCOUNTER — OFFICE VISIT (OUTPATIENT)
Dept: FAMILY MEDICINE CLINIC | Age: 34
End: 2019-06-05
Payer: COMMERCIAL

## 2019-06-05 VITALS
WEIGHT: 200.2 LBS | HEART RATE: 115 BPM | BODY MASS INDEX: 33.36 KG/M2 | SYSTOLIC BLOOD PRESSURE: 110 MMHG | OXYGEN SATURATION: 98 % | HEIGHT: 65 IN | DIASTOLIC BLOOD PRESSURE: 70 MMHG

## 2019-06-05 DIAGNOSIS — D75.839 THROMBOCYTOSIS: ICD-10-CM

## 2019-06-05 DIAGNOSIS — S83.222D PERIPHERAL TEAR OF MEDIAL MENISCUS OF LEFT KNEE AS CURRENT INJURY, SUBSEQUENT ENCOUNTER: ICD-10-CM

## 2019-06-05 PROBLEM — S83.222A PERIPHERAL TEAR OF MEDIAL MENISCUS OF LEFT KNEE AS CURRENT INJURY: Status: ACTIVE | Noted: 2019-06-05

## 2019-06-05 PROCEDURE — 99213 OFFICE O/P EST LOW 20 MIN: CPT | Performed by: FAMILY MEDICINE

## 2019-06-05 ASSESSMENT — ENCOUNTER SYMPTOMS
CHANGE IN BOWEL HABIT: 0
SORE THROAT: 0
ABDOMINAL PAIN: 0
COUGH: 0
NAUSEA: 0
SWOLLEN GLANDS: 0
VOMITING: 0
VISUAL CHANGE: 0

## 2019-06-05 NOTE — PROGRESS NOTES
[employer]   Social Needs    Financial resource strain: Not on file    Food insecurity:     Worry: Not on file     Inability: Not on file    Transportation needs:     Medical: Not on file     Non-medical: Not on file   Tobacco Use    Smoking status: Never Smoker    Smokeless tobacco: Never Used   Substance and Sexual Activity    Alcohol use: Yes     Alcohol/week: 0.6 oz     Types: 1 Glasses of wine per week    Drug use: No    Sexual activity: Yes     Partners: Male   Lifestyle    Physical activity:     Days per week: Not on file     Minutes per session: Not on file    Stress: Not on file   Relationships    Social connections:     Talks on phone: Not on file     Gets together: Not on file     Attends Moravian service: Not on file     Active member of club or organization: Not on file     Attends meetings of clubs or organizations: Not on file     Relationship status: Not on file    Intimate partner violence:     Fear of current or ex partner: Not on file     Emotionally abused: Not on file     Physically abused: Not on file     Forced sexual activity: Not on file   Other Topics Concern    Not on file   Social History Narrative    --raising 3 by self--ex helps    Gym 4 x week    Works 36--       Family History   Problem Relation Age of Onset    Depression Mother     Heart Disease Father     Stroke Father        Immunization History   Administered Date(s) Administered    DTaP 1985, 1985, 01/31/1986, 01/12/1987, 05/02/1990    IPV (Ipol) 1985, 1985, 01/31/1986, 01/27/1987    Influenza Virus Vaccine 10/19/2017, 10/19/2018    MMR 10/18/1986, 08/12/1997    Tdap (Boostrix, Adacel) 09/20/2012    Varicella (Varivax) 05/09/1998       Review of Systems  Review of Systems   Constitutional: Negative for chills, diaphoresis, fatigue and fever. HENT: Negative for congestion and sore throat. Respiratory: Negative for cough. Cardiovascular: Negative for chest pain. Gastrointestinal: Negative for abdominal pain, anorexia, change in bowel habit, nausea and vomiting. Musculoskeletal: Negative for arthralgias, joint swelling, myalgias and neck pain. Skin: Negative for rash. Neurological: Negative for vertigo, numbness and headaches. Objective:   Physical Exam  Physical Exam   Constitutional: She is oriented to person, place, and time. She appears well-developed and well-nourished. No distress. HENT:   Mouth/Throat: Oropharynx is clear and moist.   Eyes: Pupils are equal, round, and reactive to light. Conjunctivae are normal.   Neck: No JVD present. No tracheal deviation present. No thyromegaly present. Cardiovascular: Normal rate, regular rhythm, normal heart sounds and intact distal pulses. Exam reveals no gallop. No murmur heard. Pulmonary/Chest: Effort normal and breath sounds normal. No stridor. No respiratory distress. She has no wheezes. She has no rales. She exhibits no tenderness. Abdominal: Soft. Bowel sounds are normal. She exhibits no distension and no mass. There is no tenderness. Musculoskeletal: She exhibits no edema or tenderness. Tender medial jt line--ACL OK   Lymphadenopathy:     She has no cervical adenopathy. Neurological: She is alert and oriented to person, place, and time. She displays normal reflexes. No cranial nerve deficit. She exhibits normal muscle tone. Coordination normal.   Skin: Skin is warm and dry. No rash noted. No erythema. No pallor. Psychiatric: She has a normal mood and affect. Her behavior is normal. Judgment and thought content normal.   Vitals reviewed. Assessment and Plan: Thrombocytosis ([facility] [address]  ?  etiol--goes back to 2007--will d/w rheum and hem    Peripheral tear of medial meniscus of left knee as current injury  Will refer to ortho

## 2019-06-25 ENCOUNTER — OFFICE VISIT (OUTPATIENT)
Dept: ORTHOPEDIC SURGERY | Age: 34
End: 2019-06-25
Payer: COMMERCIAL

## 2019-06-25 VITALS
WEIGHT: 201 LBS | BODY MASS INDEX: 33.49 KG/M2 | SYSTOLIC BLOOD PRESSURE: 115 MMHG | HEIGHT: 65 IN | DIASTOLIC BLOOD PRESSURE: 79 MMHG | HEART RATE: 81 BPM

## 2019-06-25 DIAGNOSIS — M25.562 LEFT KNEE PAIN, UNSPECIFIED CHRONICITY: Primary | ICD-10-CM

## 2019-06-25 PROCEDURE — 99243 OFF/OP CNSLTJ NEW/EST LOW 30: CPT | Performed by: ORTHOPAEDIC SURGERY

## 2019-06-25 NOTE — PROGRESS NOTES
CHIEF COMPLAINT: Left knee pain. History:   Yady Del Valle is a 29 y.o. female referred by Malka Joiner MD for evaluation and treatment of left knee pain / injury. The patient complains of left knee pain. She does have a 17 year history of left knee pain since she was a teenager. She states she was told she had a small meniscus tear. It was treated nonoperatively with PT. It had been doing pretty well. This is evaluated as a personal injury. There was a history of injury. She was at the [facility] and her knee gave out and she fell. The pain began 2 months ago. The pain is located medial, anterior. She describes the symptoms as aching and sharp. She rates pain at 5/10. Symptoms improve with ice, Naproxen. The symptoms are worse with stair climbing, kneeling, deep knee bending, getting up from a chair, pivoting/twisting. The knee has not given out or felt unstable. The patient can bend and straighten the knee fully. There was swelling in the knee. There was catching / locking of the knee. The patient has not had PT. The patient has not had an injection. The patient has taken NSAIDs. The patient has tried ice. 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 Outside reports reviewed: office notes.     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   Social Needs    Financial resource strain: None    Food insecurity:     Worry: None Inability: None    Transportation needs:     Medical: None     Non-medical: None   Tobacco Use    Smoking status: Never Smoker    Smokeless tobacco: Never Used   Substance and Sexual Activity    Alcohol use: Yes     Alcohol/week: 0.6 oz     Types: 1 Glasses of wine per week    Drug use: No    Sexual activity: Yes     Partners: Male   Lifestyle    Physical activity:     Days per week: None     Minutes per session: None    Stress: None   Relationships    Social connections:     Talks on phone: None     Gets together: None     Attends Muslim service: None     Active member of club or organization: None     Attends meetings of clubs or organizations: None     Relationship status: None    Intimate partner violence:     Fear of current or ex partner: None     Emotionally abused: None     Physically abused: None     Forced sexual activity: None   Other Topics Concern    None   Social History Narrative    --raising 3 by self--ex helps    Gym 4 x week    Works 40--       Current Outpatient Medications   Medication Sig Dispense Refill    amitriptyline (ELAVIL) 10 MG tablet Take 2 tablets at night 180 tablet 1    budesonide-formoterol (SYMBICORT) 160-4.5 MCG/ACT AERO Inhale 2 puffs into the lungs 2 times daily Rinse mouth after use. 1 Inhaler 5    omeprazole (PRILOSEC) 20 MG delayed release capsule Take 1 capsule by mouth daily (with breakfast) 30 capsule 3    naproxen (NAPROSYN) 500 MG tablet Take 500 mg by mouth 2 times daily (with meals)      clonazePAM (KLONOPIN) 0.5 MG tablet Take 0.5 mg by mouth .  albuterol (PROVENTIL) (2.5 MG/3ML) 0.083% nebulizer solution Inhale 2.5 mg into the lungs      albuterol sulfate  (90 Base) MCG/ACT inhaler Inhale 2-4 puffs into the lungs       No current facility-administered medications for this visit. No Known Allergies    Review of Systems:  Pertinent items are noted in HPI.   13 point review of systems from Patient History Form dated 6/25/19 and available in the patient's chart under the Media tab. Physical Examination:     Vital signs:   /79   Pulse 81   Ht 5' 5\" (1.651 m)   Wt 201 lb (91.2 kg)   LMP 05/26/2019 (Exact Date)   BMI 33.45 kg/m²     General:  alert, appears stated age, cooperative and no distress   Gait:  Normal. The patient can bear weight on the injured extremity. Left Knee  Alignment:  neutral   ROM:  0 degrees extension to 120 degrees flexion   Bilateral knees   Crepitus:  no   Joint Tenderness:  medial joint line   Effusion:   0 cc   Patellar excursion:  2 of 4 quadrants    Patellar tilt test:  positive   Patellar facet tenderness:  positive medial   positive lateral   Trochlear tenderness:  positive   Patellar apprehension test:  negative   Lachman test:  negative   Right knee: negative   Anterior drawer test:  negative   Right knee: negative   Posterior drawer:   negative    Right knee: negative   Varus laxity at 30 degrees:  negative   Right knee: negative   Valgus laxity at 30 degrees:   negative   Right knee: negative   Pop's test:  positive   Right knee: negative   Hans's test:  negative   Right knee: negative   Positive duck walk    There is not any cellulitis, lymphedema or cutaneous lesions noted in the lower extremities. Motor exam of the lower extremities show quadriceps, hamstrings, foot dorsiflexion and plantarflexion grossly intact. Sensation to both feet is grossly intact to light touch. The bilateral lower extremities are warm and well-perfused with brisk capillary refill. Imaging:  Left Knee X-Ray: 3 view x-rays of the knee, including bilateral AP and sunrise and lateral of the affected side were  obtained and reviewed. No fracture and Normal alignment. Maintained joint spaces. Patella angela.      Left Knee MRI: ordered, but not yet obtained      Assessment:     Left knee internal derangement, possible medial meniscus tear  Left knee patellofemoral syndrome      Plan:     Natural history and expected course discussed. Questions answered. MRI of left knee to evaluate medial meniscus since history of tear, and positive provacative tests. Follow up after MRI.

## 2019-07-08 ENCOUNTER — HOSPITAL ENCOUNTER (OUTPATIENT)
Dept: MRI IMAGING | Age: 34
Discharge: HOME | End: 2019-07-08
Payer: COMMERCIAL

## 2019-07-08 DIAGNOSIS — M25.562 LEFT KNEE PAIN, UNSPECIFIED CHRONICITY: ICD-10-CM

## 2019-07-08 PROCEDURE — 73721 MRI JNT OF LWR EXTRE W/O DYE: CPT

## 2019-07-23 ENCOUNTER — OFFICE VISIT (OUTPATIENT)
Dept: ORTHOPEDIC SURGERY | Age: 34
End: 2019-07-23
Payer: COMMERCIAL

## 2019-07-23 VITALS — WEIGHT: 201.06 LBS | BODY MASS INDEX: 33.5 KG/M2 | HEIGHT: 65 IN | HEART RATE: 62 BPM | RESPIRATION RATE: 17 BRPM

## 2019-07-23 DIAGNOSIS — M22.2X2 PATELLOFEMORAL PAIN SYNDROME OF LEFT KNEE: ICD-10-CM

## 2019-07-23 DIAGNOSIS — E88.89 HOFFA'S SYNDROME (HCC): Primary | ICD-10-CM

## 2019-07-23 PROCEDURE — 99213 OFFICE O/P EST LOW 20 MIN: CPT | Performed by: ORTHOPAEDIC SURGERY

## 2019-07-23 NOTE — PROGRESS NOTES
facility-administered medications for this visit. No Known Allergies    Review of Systems:  Pertinent items are noted in HPI. 13 point review of systems from Patient History Form dated 6/25/19 and available in the patient's chart under the Media tab. Physical Examination:     Vital signs:   Pulse 62   Resp 17   Ht 5' 5\" (1.651 m)   Wt 201 lb 1 oz (91.2 kg)   BMI 33.46 kg/m²      General:  alert, appears stated age, cooperative and no distress   Gait:  Normal. The patient can bear weight on the injured extremity. Left Knee  Alignment:  neutral   ROM:  0 degrees extension to 120 degrees flexion   Bilateral knees   Crepitus:  no   Joint Tenderness:  medial joint line   Effusion:   0 cc   Patellar excursion:  2 of 4 quadrants    Patellar tilt test:  positive   Patellar facet tenderness:  positive medial   positive lateral   Trochlear tenderness:  positive   Patellar apprehension test:  negative   Lachman test:  negative   Right knee: negative   Anterior drawer test:  negative   Right knee: negative   Posterior drawer:   negative    Right knee: negative   Varus laxity at 30 degrees:  negative   Right knee: negative   Valgus laxity at 30 degrees:   negative   Right knee: negative   Pop's test:  positive   Right knee: negative   Hans's test:  negative   Right knee: negative   Positive hoffa test medial and lateral.  Positive hyperextension test.    There is not any cellulitis, lymphedema or cutaneous lesions noted in the lower extremities. Motor exam of the lower extremities show quadriceps, hamstrings, foot dorsiflexion and plantarflexion grossly intact. Sensation to both feet is grossly intact to light touch. The bilateral lower extremities are warm and well-perfused with brisk capillary refill. Imaging:  Left Knee X-Ray 6/25/19:  No fracture and Normal alignment. Maintained joint spaces. Patella angela.      Left Knee MRI: I independently reviewed the images, as well as the radiology

## 2019-08-05 ENCOUNTER — OFFICE VISIT (OUTPATIENT)
Dept: RHEUMATOLOGY | Age: 34
End: 2019-08-05
Payer: COMMERCIAL

## 2019-08-05 VITALS
BODY MASS INDEX: 33.66 KG/M2 | HEIGHT: 65 IN | SYSTOLIC BLOOD PRESSURE: 112 MMHG | DIASTOLIC BLOOD PRESSURE: 64 MMHG | WEIGHT: 202 LBS

## 2019-08-05 DIAGNOSIS — M79.7 FIBROMYALGIA: ICD-10-CM

## 2019-08-05 DIAGNOSIS — G89.29 CHRONIC MIDLINE LOW BACK PAIN WITHOUT SCIATICA: Primary | ICD-10-CM

## 2019-08-05 DIAGNOSIS — D75.839 THROMBOCYTOSIS: ICD-10-CM

## 2019-08-05 DIAGNOSIS — M54.50 CHRONIC MIDLINE LOW BACK PAIN WITHOUT SCIATICA: Primary | ICD-10-CM

## 2019-08-05 PROCEDURE — 99214 OFFICE O/P EST MOD 30 MIN: CPT | Performed by: INTERNAL MEDICINE

## 2019-08-05 RX ORDER — PREGABALIN 25 MG/1
25 CAPSULE ORAL 2 TIMES DAILY
Qty: 60 CAPSULE | Refills: 2 | Status: SHIPPED | OUTPATIENT
Start: 2019-08-05 | End: 2020-05-04 | Stop reason: SINTOL

## 2019-08-05 NOTE — PROGRESS NOTES
CO2 22 05/19/2018 0715    BUN 11 05/19/2018 0715    CREATININE 0.8 05/19/2018 0715        Component Value Date/Time    CALCIUM 9.0 05/19/2018 0715    ALKPHOS 48 05/19/2018 0715    AST 17 05/19/2018 0715    ALT 10 05/19/2018 0715    BILITOT 0.3 05/19/2018 0715          Lab Results   Component Value Date    TSH 2.70 05/19/2018     Lab Results   Component Value Date    VITD25 21.0 (L) 05/19/2018       A/P- See above.

## 2019-08-05 NOTE — PATIENT INSTRUCTIONS
Fibromyalgia    Fibromyalgia is a common health problem that causes widespread pain and tenderness (sensitive to touch). The pain and tenderness tend to come and go, and move about the body. Most often, people with this chronic (long-term) illness are fatigued (very tired) and have sleep problems. It can be hard to diagnose fibromyalgia. Fast facts  Fibromyalgia affects two to four percent of people, mostly women. Doctors diagnose fibromyalgia based on all the patient's relevant symptoms (what you feel), no longer just on the number of tender points. There is no test to detect this disease, but you may need lab tests or X-rays to rule out other health problems. Though there is no cure, medications can relieve symptoms. Patients also may feel better with proper self-care, such as exercise and getting enough sleep. What is fibromyalgia? Fibromyalgia is a chronic health problem that causes pain all over the body and other symptoms. Other symptoms that patients most often have are:  Tenderness to touch or pressure affecting joints and muscles   Fatigue   Sleep problems (waking up unrefreshed)   Problems with memory or thinking clearly  Some patients also may have:  Depression or anxiety   Migraine or tension headaches   Digestive problems: irritable bowel syndrome (commonly called IBS) or gastroesophageal reflux disease (often referred to as GERD)   Irritable or overactive bladder   Pelvic pain   Temporomandibular disorder--often called TMJ (a set of symptoms including face or jaw pain, jaw clicking and ringing in the ears)  Symptoms of fibromyalgia and its related problems can vary in intensity, and will wax and wane over time. Stress often worsens the symptoms. What causes fibromyalgia? The causes of fibromyalgia are unclear. They may be different in different people. Fibromyalgia may run in families.  There likely are certain genes that can make people more prone to getting fibromyalgia and the other life.  The role of the rheumatologist  Fibromyalgia is not a form of arthritis (joint disease). It does not cause inflammation or damage to joints, muscles or other tissues. However, because fibromyalgia can cause chronic pain and fatigue similar to arthritis, some people may think of it as a rheumatic condition. As a result, often a rheumatologist detects this disease (and rules out other rheumatic diseases). Your primary care physician can provide all the other care and treatment of fibromyalgia that you need. To find a rheumatologist  For a listing of rheumatologists in your area, click here. For more information about rheumatologists click here. Fibromyalgia Network   www. fmnetnews. [address] of Arthritis and Musculoskeletal and Skin Diseases  www.niams. nih.gov/hi/topics/fibromyalgia/fibrofs. htm   National Fibromyalgia Association   www. fmaware. [address] fmpartnership.org   [place]. [email or web address] org

## 2019-08-12 ENCOUNTER — HOSPITAL ENCOUNTER (OUTPATIENT)
Dept: PHYSICAL THERAPY | Age: 34
Setting detail: THERAPIES SERIES
Discharge: HOME | End: 2019-08-12
Payer: COMMERCIAL

## 2019-08-12 PROCEDURE — 97110 THERAPEUTIC EXERCISES: CPT

## 2019-08-12 PROCEDURE — 97161 PT EVAL LOW COMPLEX 20 MIN: CPT

## 2019-08-12 NOTE — PLAN OF CARE
Mod WNL   Quads (Ely's) Mild WNL   Hip Flexor Cloria Ramming) NT NT        Girth (cm)     Mid patella     Suprapatellar     Figure 8     Transmalleolar     Metatarsal Heads         Orthopaedic Special Tests  Positive  Negative  NT Comments    Hip              Knee       Pop's   x     Claudio's y   (L)                          Joint mobility:    [x]Normal    []Hypo   []Hyper    Balance:  (R) SLB > 20 s  (L) SLS                           [x] Patient history, allergies, meds reviewed. Medical chart reviewed. See intake form. Review Of Systems (ROS):  [x]Performed Review of systems (Integumentary, CardioPulmonary, Neurological) by intake and observation. Intake form has been scanned into medical record. Patient has been instructed to contact their primary care physician regarding ROS issues if not already being addressed at this time.       Co-morbidities/Complexities (which will affect course of rehabilitation):   []None           Arthritic conditions   []Rheumatoid arthritis (M05.9)  []Osteoarthritis (M19.91)   Cardiovascular conditions   []Hypertension (I10)  []Hyperlipidemia (E78.5)  []Angina pectoris (I20)  []Atherosclerosis (I70)   Musculoskeletal conditions   []Disc pathology   []Congenital spine pathologies   []Prior surgical intervention  []Osteoporosis (M81.8)  []Osteopenia (M85.8)   Endocrine conditions   []Hypothyroid (E03.9)  []Hyperthyroid Gastrointestinal conditions   []Constipation (F38.52)   Metabolic conditions   []Morbid obesity (E66.01)  []Diabetes type 1(E10.65) or 2 (E11.65)   []Neuropathy (G60.9)     Pulmonary conditions   [x]Asthma (J45)  []Coughing   []COPD (J44.9)   Psychological Disorders  []Anxiety (F41.9)  []Depression (F32.9)   []Other:   [x]Other: Fibromyalgia          Barriers to/and or personal factors that will affect rehab potential:              []Age  []Sex    []Smoker              []Motivation/Lack of Motivation                        []Co-Morbidities              []Cognitive Function, education/learning barriers              []Environmental, home barriers              []profession/work barriers  []past PT/medical experience  [x]other: chronic  Justification:    Falls Risk Assessment (30 days):  [x] Falls Risk assessed and no intervention required. [] Falls Risk assessed and Patient requires intervention due to being higher risk   TUG score (>12s at risk):     [] Falls education provided, including        ASSESSMENT:   Functional Impairments:     []Noted lumbar/proximal hip/LE joint hypomobility   [x]Decreased LE functional ROM/ flexibility   [x]Decreased core/proximal hip strength and neuromuscular control   [x]Decreased LE functional strength   [x]Reduced balance/proprioceptive control   []other:      Functional Activity Limitations (from functional questionnaire and intake)   []Reduced ability to tolerate prolonged functional positions   []Reduced ability or difficulty with changes of positions or transfers between positions   [x]Reduced ability to maintain good posture and demonstrate good body mechanics with sitting, bending, and lifting   []Reduced ability to sleep   [] Reduced ability or tolerance with driving and/or computer work   [x]Reduced ability to perform lifting, carrying tasks   [x]Reduced ability to squat   []Reduced ability to forward bend   [x]Reduced ability to ambulate prolonged functional periods/distances/surfaces   [x]Reduced ability to ascend/descend stairs   [x]Reduced ability to run, hop, cut or jump   []other:    Participation Restrictions   []Reduced participation in self care activities   [x]Reduced participation in home management activities   []Reduced participation in work activities   [x]Reduced participation in social activities. [x]Reduced participation in sport/recreation activities. Classification :    []Signs/symptoms consistent with post-surgical status including decreased ROM, strength and function.    []Signs/symptoms consistent with joint

## 2019-08-13 ENCOUNTER — HOSPITAL ENCOUNTER (OUTPATIENT)
Dept: MRI IMAGING | Age: 34
Discharge: HOME | End: 2019-08-13
Payer: COMMERCIAL

## 2019-08-13 DIAGNOSIS — G89.29 CHRONIC MIDLINE LOW BACK PAIN WITHOUT SCIATICA: ICD-10-CM

## 2019-08-13 DIAGNOSIS — M54.50 CHRONIC MIDLINE LOW BACK PAIN WITHOUT SCIATICA: ICD-10-CM

## 2019-08-13 PROCEDURE — 72148 MRI LUMBAR SPINE W/O DYE: CPT

## 2019-08-23 ENCOUNTER — HOSPITAL ENCOUNTER (OUTPATIENT)
Dept: PHYSICAL THERAPY | Age: 34
Setting detail: THERAPIES SERIES
Discharge: HOME | End: 2019-08-23
Payer: COMMERCIAL

## 2019-08-23 PROCEDURE — 97140 MANUAL THERAPY 1/> REGIONS: CPT

## 2019-08-23 PROCEDURE — 97112 NEUROMUSCULAR REEDUCATION: CPT

## 2019-08-23 PROCEDURE — 97110 THERAPEUTIC EXERCISES: CPT

## 2019-08-23 NOTE — FLOWSHEET NOTE
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 Brendon Peoples of care signed (Y/N): Y    Date of Patient follow up with Physician:     Progress Note: []  Yes  [x]  No  Next due by: Visit #10       Latex Allergy:  [x]NO      []YES  Preferred Language for Healthcare:   [x]English       []other:    Visit # Insurance Allowable Date Range   2 MN NA     Pain level:  1/10 at worst     SUBJECTIVE: Pt states that she has minimal pain in her knee. Pt states it still feels like pressure. Pt states that her hip is tight this date.     OBJECTIVE: 19  Mild hypomobility TFJ posteriorly, improved quickly with mobs  Palpable trigger points/restrictions distal third of (L) TFL/IT Band    RESTRICTIONS/PRECAUTIONS: None    Exercises/Interventions:     Therapeutic Exercise (12494) x30' Resistance / level Sets/sec Reps Notes / Cues   Supine TFL stretch Strap (L) 30\" 3 HEP   Standing Quad stretch (L) 30\" 3 HEP cueing for posterior pelvic tilt   Supine SLR 4lb 2 10 HEP   Sidelying SLR Abduction 4lb 2 10 HEP cueing for pelvic position   Supine Bridge 3\" 2 10 HEP cueing for glut activation/pelvic stability Held   Seated Leg Press DL 105lbs 3 10 ea  HEP cueing for alignment, set up, and ecc control   Stationary Bike L3 3' Warm up    Standing Gastroc stretch incline 30\" 3 Added    Seated Hamstring Curls 45lb DL 3 10 Range 0-90 Added    Lateral Stepping blue 3 laps 10 out/back Reviewed    TRX Squats To 90 2 10 Added  cueing to prevent patient tolerance, in order to prevent re-injury. 2. Patient will have a decrease in pain to facilitate improvement in movement, function, and ADLs as indicated by Functional Deficits. Long Term Goals: To be achieved in: 6 weeks  1. Disability index score of 11% or less for the LEFS to assist with reaching prior level of function. 2. Patient will demonstrate increased TFL flexibility (L) to mild deficits or < in order to help reduce knee pain, and help with gait mechanics. 3. Patient will demonstrate an increase in Strength to at least 4+/5 or > (L) hip musculature in all planes to improve hip and knee stability with daily and recreational activities. 4. Patient will be able to participate in an appropriate independent exercise program at the gym without any significant limitation or restriction due to the (L) knee. Progression Towards Functional goals:  [] Patient is progressing as expected towards functional goals listed. [] Progression is slowed due to complexities listed. [] Progression has been slowed due to co-morbidities. [x] Plan just implemented, too soon to assess goals progression  [] Other:     Persisting Functional Limitations/Impairments:  []Sitting []Standing   [x]Walking [x]Stairs   [x]Transfers [x]ADLs   [x]Squatting/bending [x]Kneeling  []Housework []Job related tasks  []Driving [x]Sports/Recreation   []Sleeping []Other:    ASSESSMENT:  Pt very challenged by balance activities and does need to work on improving proprioceptive awareness in order to help improve mechanics and avoid abnormal stress at the (L) knee. Otherwise, no significant pain noted throughout the session despite significant progression this date. Distal third of the IT band/TFL remains very tight and may be influencing patellar tracking. Possibly a result of decreased gluteal strength.   Pt would continue to benefit from formal therapy to address the stated deficits in order to help pt return to normal

## 2019-08-30 ENCOUNTER — HOSPITAL ENCOUNTER (OUTPATIENT)
Dept: PHYSICAL THERAPY | Age: 34
Setting detail: THERAPIES SERIES
Discharge: HOME | End: 2019-08-30
Payer: COMMERCIAL

## 2019-08-30 PROCEDURE — 97140 MANUAL THERAPY 1/> REGIONS: CPT

## 2019-08-30 PROCEDURE — 97112 NEUROMUSCULAR REEDUCATION: CPT

## 2019-08-30 PROCEDURE — 97110 THERAPEUTIC EXERCISES: CPT

## 2019-08-30 NOTE — FLOWSHEET NOTE
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 Carmina Suggs of care signed (Y/N): Y    Date of Patient follow up with Physician:     Progress Note: []  Yes  [x]  No  Next due by: Visit #10       Latex Allergy:  [x]NO      []YES  Preferred Language for Healthcare:   [x]English       []other:    Visit # Insurance Allowable Date Range   3 MN NA     Pain level:  1/10 at worst     SUBJECTIVE: Pt states that her pain in the (L) knee continues to be minimal.  Pt states she still feels pressure mainly later at night. Pt states that her knee \"gave out\" on her twice over the last week. Pt states this occurred while walking and was later at night. Pt continues to feel that her (L) hip is tight.     OBJECTIVE: 19  Improved tissue mobility (L) TFL/IT Band  Fuad's remains (+) (L)    Moderate flexibility deficit (L) Piriformis, tissue restrictions noted, palpation reproduces perceived \"tightness\" (L) hip    (L) LE   Quad Strength: 5/5  Hip Abd Strength: 4/5    RESTRICTIONS/PRECAUTIONS: None    Exercises/Interventions:     Therapeutic Exercise (64942) x27' Resistance / level Sets/sec Reps Notes / Cues   Supine TFL stretch Strap (L) 30\" 3 HEP   Standing Quad stretch (L) 30\" 3 HEP cueing for posterior pelvic tilt   Sidelying SLR Abduction 5lb 2 10 HEP cueing for pelvic position ^ 8/30   Supine Bridge SL 3\" 2 10 HEP cueing for glut activation/pelvic stability ^ to SL 8/30   Seated Leg Press DL 105lbs 3 10 ea  HEP cueing for alignment, set (10101) x 1    [] Vaso  [x] Manual (48012) x 1      [] Ultrasound  [] TA x       [] Mech Traction (71149)  [] Aquatic Therapy x     [] ES (un) (67286):   [] Home Management Training x [] ES(attended) (42169)   [] Group:     [] Other:     GOALS:  Patient stated goal: Reduce pain with normal activities and exercise    Therapist goals for Patient:   Short Term Goals: To be achieved in: 2 weeks  1. Independent in HEP and progression per patient tolerance, in order to prevent re-injury. 2. Patient will have a decrease in pain to facilitate improvement in movement, function, and ADLs as indicated by Functional Deficits. Long Term Goals: To be achieved in: 6 weeks  1. Disability index score of 11% or less for the LEFS to assist with reaching prior level of function. 2. Patient will demonstrate increased TFL flexibility (L) to mild deficits or < in order to help reduce knee pain, and help with gait mechanics. 3. Patient will demonstrate an increase in Strength to at least 4+/5 or > (L) hip musculature in all planes to improve hip and knee stability with daily and recreational activities. 4. Patient will be able to participate in an appropriate independent exercise program at the gym without any significant limitation or restriction due to the (L) knee. Progression Towards Functional goals:  [x] Patient is progressing as expected towards functional goals listed. [] Progression is slowed due to complexities listed. [] Progression has been slowed due to co-morbidities. [] Plan just implemented, too soon to assess goals progression  [] Other:     Persisting Functional Limitations/Impairments:  []Sitting []Standing   [x]Walking [x]Stairs   [x]Transfers [x]ADLs   [x]Squatting/bending [x]Kneeling  []Housework []Job related tasks  []Driving [x]Sports/Recreation   []Sleeping []Other:    ASSESSMENT:  Pt's perceived tightness in the (L) hip appears to be due to restrictions in the (L) piriformis.   Pt stated

## 2019-09-06 ENCOUNTER — HOSPITAL ENCOUNTER (OUTPATIENT)
Dept: PHYSICAL THERAPY | Age: 34
Setting detail: THERAPIES SERIES
Discharge: HOME | End: 2019-09-06
Payer: COMMERCIAL

## 2019-09-06 PROCEDURE — 97110 THERAPEUTIC EXERCISES: CPT

## 2019-09-06 PROCEDURE — 97140 MANUAL THERAPY 1/> REGIONS: CPT

## 2019-09-06 PROCEDURE — 97112 NEUROMUSCULAR REEDUCATION: CPT

## 2019-09-06 NOTE — FLOWSHEET NOTE
Stationary Bike L3 3' Warm up    Standing Gastroc stretch incline 30\" 3 Added 8/23 Held   Seated Hamstring Curls 45lb DL 3 10 Range 0-90 Added 8/23    Lateral Stepping blue 3 laps 10 out/back Reviewed 8/23 Held   TRX Squats To 90 2 10 Added 8/23 cueing to prevent valgus collapse of knees Held   Supine Piriformis Stretch  30\" 3 Added 8/30          Therapeutic Activities (52587)                                   Neuromuscular Re-ed (52141) x 10'       Tiltboard A/P and M/L  10 taps ea way with 10\" holds on 10th rep Added 8/23    Biodex PS lvl 10 3'  Added 8/23 cueing to keep knees unlocked to increase challenge of balance   Retro Stepdown L2 2 10 Added 8/30 cueing for glut activation/emphasis                        Manual Intervention (17550) x 15'       Knee mobs/PROM       Belt Mobs Hip Lateral GR III and MWM ER with lateral mob      Patella Mobs Medial, inf, sup Gr III  To maximize efficienc of movement   Manual S/L TFL stretch (L)       Foam roller/STM Distal third of (L) IT Band/TFL, piriformis 8'                Pt. Education:  -pt educated on diagnosis, prognosis and expectations for rehab  -pt provided with written and illustrated instructions for HEP  -all pt questions were answered    Therapeutic Exercise and NMR EXR  [x] (14385) Provided verbal/tactile cueing for activities related to strengthening, flexibility, endurance, ROM for improvements in LE, proximal hip, and core control with self care, mobility, lifting, ambulation.  [] (79674) Provided verbal/tactile cueing for activities related to improving balance, coordination, kinesthetic sense, posture, motor skill, proprioception  to assist with LE, proximal hip, and core control in self care, mobility, lifting, ambulation and eccentric single leg control.      NMR and Therapeutic Activities:    [x] (45065 or 34015) Provided verbal/tactile cueing for activities related to improving balance, coordination, kinesthetic sense, posture, motor skill, strength. Pt does continue to have significant restrictions and pain with the (L) hip that appears to be related to the gluteal musculature. Mild hypomobility also noted in the hip this date. Pt would continue to benefit from formal therapy to further addressed noted issues in order to help restore normal function.     Treatment/Activity Tolerance:  [x] Pt able to complete treatment [] Patient limited by fatique  [] Patient limited by pain  [] Patient limited by other medical complications  [] Other:     Prognosis:  [x] Good [] Fair  [] Poor    Patient Requires Follow-up: [x] Yes  [] No    Return to Play:    [x]  N/A   []  Stage 1: Intro to Strength   []  Stage 2: Return to Run and Strength   []  Stage 3: Return to Jump and Strength   []  Stage 4: Dynamic Strength and Agility   []  Stage 5: Sport Specific Training     []  Ready to Return to Play, Meets All Above Stages   []  Not Ready for Return to Sports   Comments:            PLAN:  PT 1-2x/wk for 6 wks  [] Continue per plan of care [] Alter current plan (see comments)  [x] Plan of care initiated [] Hold pending MD visit [] Discharge    Electronically signed by: Giuliana Zuleta PT, DPT

## 2019-09-13 ENCOUNTER — HOSPITAL ENCOUNTER (OUTPATIENT)
Dept: PHYSICAL THERAPY | Age: 34
Setting detail: THERAPIES SERIES
Discharge: HOME | End: 2019-09-13
Payer: COMMERCIAL

## 2019-09-13 PROCEDURE — 97140 MANUAL THERAPY 1/> REGIONS: CPT

## 2019-09-13 PROCEDURE — 97112 NEUROMUSCULAR REEDUCATION: CPT

## 2019-09-13 PROCEDURE — 97110 THERAPEUTIC EXERCISES: CPT

## 2019-09-13 NOTE — FLOWSHEET NOTE
Management Training x [] ES(attended) (34680)   [] Group:     [] Other:     GOALS:  Patient stated goal: Reduce pain with normal activities and exercise    Therapist goals for Patient:   Short Term Goals: To be achieved in: 2 weeks  1. Independent in HEP and progression per patient tolerance, in order to prevent re-injury. 2. Patient will have a decrease in pain to facilitate improvement in movement, function, and ADLs as indicated by Functional Deficits. Long Term Goals: To be achieved in: 6 weeks  1. Disability index score of 11% or less for the LEFS to assist with reaching prior level of function. 2. Patient will demonstrate increased TFL flexibility (L) to mild deficits or < in order to help reduce knee pain, and help with gait mechanics. 3. Patient will demonstrate an increase in Strength to at least 4+/5 or > (L) hip musculature in all planes to improve hip and knee stability with daily and recreational activities. 4. Patient will be able to participate in an appropriate independent exercise program at the gym without any significant limitation or restriction due to the (L) knee. Progression Towards Functional goals:  [x] Patient is progressing as expected towards functional goals listed. [] Progression is slowed due to complexities listed. [] Progression has been slowed due to co-morbidities. [] Plan just implemented, too soon to assess goals progression  [] Other:     Persisting Functional Limitations/Impairments:  []Sitting []Standing   [x]Walking [x]Stairs   [x]Transfers [x]ADLs   [x]Squatting/bending [x]Kneeling  []Housework []Job related tasks  []Driving [x]Sports/Recreation   []Sleeping []Other:    ASSESSMENT:  Pt performs exercises without significant difficulty and appears to be progressing with improving strength and motion especially since starting manual therapy.  Continue to monitor symptoms and response to manual therapy and advance exercise routine as tolerated for improved strength and functional mobility with progression toward goals. Pt will continue to benefit from improved hip and LE strength as well as proprioception and stability and improved ROM/flexibility to return to performing daily tasks without limitations and restrictions.      Treatment/Activity Tolerance:  [x] Pt able to complete treatment [] Patient limited by fatique  [] Patient limited by pain  [] Patient limited by other medical complications  [] Other:     Prognosis:  [x] Good [] Fair  [] Poor    Patient Requires Follow-up: [x] Yes  [] No    Return to Play:    [x]  N/A   []  Stage 1: Intro to Strength   []  Stage 2: Return to Run and Strength   []  Stage 3: Return to Jump and Strength   []  Stage 4: Dynamic Strength and Agility   []  Stage 5: Sport Specific Training   []  Ready to Return to Play, Meets All Above Stages   []  Not Ready for Return to Sports   Comments:            PLAN:  PT 1-2x/wk for 6 wks  [] Continue per plan of care [] Alter current plan (see comments)  [x] Plan of care initiated [] Hold pending MD visit [] Discharge    Electronically signed by: [id number]

## 2019-09-20 ENCOUNTER — APPOINTMENT (OUTPATIENT)
Dept: PHYSICAL THERAPY | Age: 34
End: 2019-09-20
Payer: COMMERCIAL

## 2019-09-24 ENCOUNTER — OFFICE VISIT (OUTPATIENT)
Dept: ORTHOPEDIC SURGERY | Age: 34
End: 2019-09-24
Payer: COMMERCIAL

## 2019-09-24 VITALS
WEIGHT: 201.94 LBS | DIASTOLIC BLOOD PRESSURE: 78 MMHG | BODY MASS INDEX: 33.65 KG/M2 | HEART RATE: 87 BPM | SYSTOLIC BLOOD PRESSURE: 121 MMHG | HEIGHT: 65 IN | RESPIRATION RATE: 17 BRPM

## 2019-09-24 DIAGNOSIS — E88.89 HOFFA'S SYNDROME (HCC): Primary | ICD-10-CM

## 2019-09-24 DIAGNOSIS — M22.2X2 PATELLOFEMORAL PAIN SYNDROME OF LEFT KNEE: ICD-10-CM

## 2019-09-24 DIAGNOSIS — M70.62 TROCHANTERIC BURSITIS OF LEFT HIP: ICD-10-CM

## 2019-09-24 PROCEDURE — 20605 DRAIN/INJ JOINT/BURSA W/O US: CPT | Performed by: ORTHOPAEDIC SURGERY

## 2019-09-24 PROCEDURE — 99214 OFFICE O/P EST MOD 30 MIN: CPT | Performed by: ORTHOPAEDIC SURGERY

## 2019-09-24 RX ORDER — TRIAMCINOLONE ACETONIDE 40 MG/ML
40 INJECTION, SUSPENSION INTRA-ARTICULAR; INTRAMUSCULAR ONCE
Status: COMPLETED | OUTPATIENT
Start: 2019-09-24 | End: 2019-09-24

## 2019-09-24 RX ADMIN — TRIAMCINOLONE ACETONIDE 40 MG: 40 INJECTION, SUSPENSION INTRA-ARTICULAR; INTRAMUSCULAR at 08:15

## 2019-09-24 NOTE — PROGRESS NOTES
CHIEF COMPLAINT: Left knee follow up. Left hip pain    History:   Treva Vázquez is a 29 y.o. female here for left knee follow up and left hip pain. Initial history:  referred by Marcial Pastor MD for evaluation and treatment of left knee pain / injury. The patient complains of left knee pain. She does have a 17 year history of left knee pain since she was a teenager. She states she was told she had a small meniscus tear. It was treated nonoperatively with PT. It had been doing pretty well. This is evaluated as a personal injury. There was a history of injury. She was at the [facility] and her knee gave out and she fell. The pain began 2 months ago. The pain is located medial, anterior. She describes the symptoms as aching and sharp. She rates pain at 5/10. Symptoms improve with ice, Naproxen. The symptoms are worse with stair climbing, kneeling, deep knee bending, getting up from a chair, pivoting/twisting. The knee has not given out or felt unstable. The patient can bend and straighten the knee fully. There was swelling in the knee. There was catching / locking of the knee. The patient has not had PT. The patient has not had an injection. The patient has taken NSAIDs. The patient has tried ice. 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 Interval History:  Knee feels much better, but her hip has been bothering her since starting therapy. .  Rates pain 0/10 in knee. She did PT at [facility]. Pain is located lateral hip. It is worse with activity. She has iced. She has been working with them in therapy on her hip. They have been doing hip strengthening and ITB stretching.         Past Medical History:   Diagnosis Date    Asthma     Reactive airway disease without complication 5165       Past Surgical History:   Procedure Laterality Date     SECTION      HYSTEROSCOPY      LAPAROSCOPY      OVARIAN CYST REMOVAL Right     TUBAL LIGATION Motor exam of the lower extremities show quadriceps, hamstrings, foot dorsiflexion and plantarflexion grossly intact. Sensation to both feet is grossly intact to light touch. The bilateral lower extremities are warm and well-perfused with brisk capillary refill. Imaging:  Left Knee X-Ray 6/25/19:  No fracture and Normal alignment. Maintained joint spaces. Patella angela. Left Knee MRI:   Focal edema of the fat between the lateral femoral condyle and patellar   tendon suggesting lateral femoral condylar patellar tendon friction syndrome. Associated nonspecific edema within the infrapatellar fat pad without   significant joint effusion.       No significant degenerative changes.  No evidence of meniscal or ligamentous   tear. Assessment:     Left trochanteric bursitis  Left knee Hoffa syndrome  Left knee patellofemoral syndrome      Plan: Ice prn. NSAIDs prn. Continue / finish PT. Continue HEP. The risks and benefits of an injection were discussed with the patient. The patient had full opportunity to ask questions and all were answered. The patient then provided verbal informed consent. The skin was then prepped with betadine solution and alcohol. Under aseptic conditions, the  left trochanteric bursa was injected with 2cc of 1% xylocaine, 2cc of 0.25% marcaine, and 1cc of Kenalog (40mg/ml). There were no immediate complications following the injection. The patient was advised of the possibility of injection site reaction and instructed to apply ice to the area and take NSAIDs if able. Follow up 3 months prn for hip.

## 2019-09-27 ENCOUNTER — HOSPITAL ENCOUNTER (OUTPATIENT)
Dept: PHYSICAL THERAPY | Age: 34
Setting detail: THERAPIES SERIES
Discharge: HOME | End: 2019-09-27
Payer: COMMERCIAL

## 2019-09-27 PROCEDURE — 97112 NEUROMUSCULAR REEDUCATION: CPT

## 2019-09-27 PROCEDURE — 97110 THERAPEUTIC EXERCISES: CPT

## 2019-09-27 PROCEDURE — 97140 MANUAL THERAPY 1/> REGIONS: CPT

## 2019-09-27 NOTE — FLOWSHEET NOTE
kinesthetic sense, posture, motor skill, proprioception and motor activation to allow for proper function of core, proximal hip and LE with self care and ADLs  [] (18060) Gait Re-education- Provided training and instruction to the patient for proper LE, core and proximal hip recruitment and positioning and eccentric body weight control with ambulation re-education including up and down stairs     Home Exercise Program:    [x] (04013) Reviewed/Progressed HEP activities related to strengthening, flexibility, endurance, ROM of core, proximal hip and LE for functional self-care, mobility, lifting and ambulation/stair navigation   [] (97160)Reviewed/Progressed HEP activities related to improving balance, coordination, kinesthetic sense, posture, motor skill, proprioception of core, proximal hip and LE for self care, mobility, lifting, and ambulation/stair navigation      Manual Treatments:  PROM / STM / Oscillations-Mobs:  G-I, II, III, IV (PA's, Inf., Post.)  [x] (24407) Provided manual therapy to mobilize LE, proximal hip and/or LS spine soft tissue/joints for the purpose of modulating pain, promoting relaxation,  increasing ROM, reducing/eliminating soft tissue swelling/inflammation/restriction, improving soft tissue extensibility and allowing for proper ROM for normal function with self care, mobility, lifting and ambulation. Modalities:  [] (24857) Vasopneumatic compression: Utilized vasopneumatic compression to decrease edema / swelling for the purpose of improving mobility and quad tone / recruitment which will allow for increased overall function including but not limited to self-care, transfers, ambulation, and ascending / descending stairs.        Modalities: declined      Charges:  Timed Code Treatment Minutes: 62'   Total Treatment Minutes: 58'     [] EVAL - LOW (00962)   [] EVAL - MOD (52820)  [] EVAL - HIGH (03126)  [] RE-EVAL (02232)  [x] OQ(28961) x 2    [] Ionto  [x] NMR (96908) x 1    [] Vaso  [x] benefit from 2-3 more sessions to help resolve hip pain in order to improve function and help prevent return of (L) knee pain via IT band dysfunction or hip dysfunction. Pt is in agreement with this plan. Pt continues to improve and (L) knee is doing well overall. Pt is demonstrating better glut activation over TFL activation during sidelying abduction/glut strengthening exercises.     Treatment/Activity Tolerance:  [x] Pt able to complete treatment [] Patient limited by fatique  [] Patient limited by pain  [] Patient limited by other medical complications  [] Other:     Prognosis:  [x] Good [] Fair  [] Poor    Patient Requires Follow-up: [x] Yes  [] No    Return to Play:    [x]  N/A   []  Stage 1: Intro to Strength   []  Stage 2: Return to Run and Strength   []  Stage 3: Return to Jump and Strength   []  Stage 4: Dynamic Strength and Agility   []  Stage 5: Sport Specific Training   []  Ready to Return to Play, Meets All Above Stages   []  Not Ready for Return to Sports   Comments:            PLAN:  1x/wk for 2-3 weeks, plan to d/c at that time  [x] Continue per plan of care [] Alter current plan (see comments)  [] Plan of care initiated [] Hold pending MD visit [] Discharge    Electronically signed by: Selina Gonzalez PT, DPT

## 2019-10-04 ENCOUNTER — HOSPITAL ENCOUNTER (OUTPATIENT)
Dept: PHYSICAL THERAPY | Age: 34
Setting detail: THERAPIES SERIES
Discharge: HOME | End: 2019-10-04
Payer: COMMERCIAL

## 2019-10-04 PROCEDURE — 97112 NEUROMUSCULAR REEDUCATION: CPT

## 2019-10-04 PROCEDURE — 97110 THERAPEUTIC EXERCISES: CPT

## 2019-10-07 ENCOUNTER — APPOINTMENT (OUTPATIENT)
Dept: PHYSICAL THERAPY | Age: 34
End: 2019-10-07
Payer: COMMERCIAL

## 2019-10-14 ENCOUNTER — HOSPITAL ENCOUNTER (OUTPATIENT)
Dept: PHYSICAL THERAPY | Age: 34
Setting detail: THERAPIES SERIES
Discharge: HOME | End: 2019-10-14
Payer: COMMERCIAL

## 2019-10-14 PROCEDURE — 97530 THERAPEUTIC ACTIVITIES: CPT

## 2019-10-14 PROCEDURE — 97110 THERAPEUTIC EXERCISES: CPT

## 2019-11-05 ENCOUNTER — OFFICE VISIT (OUTPATIENT)
Dept: RHEUMATOLOGY | Age: 34
End: 2019-11-05
Payer: COMMERCIAL

## 2019-11-05 VITALS
BODY MASS INDEX: 34.16 KG/M2 | SYSTOLIC BLOOD PRESSURE: 120 MMHG | HEIGHT: 65 IN | DIASTOLIC BLOOD PRESSURE: 74 MMHG | WEIGHT: 205 LBS

## 2019-11-05 DIAGNOSIS — M79.7 FIBROMYALGIA: Primary | ICD-10-CM

## 2019-11-05 PROCEDURE — 99214 OFFICE O/P EST MOD 30 MIN: CPT | Performed by: INTERNAL MEDICINE

## 2019-11-05 RX ORDER — PREGABALIN 50 MG/1
50 CAPSULE ORAL 2 TIMES DAILY
Qty: 60 CAPSULE | Refills: 0 | Status: SHIPPED | OUTPATIENT
Start: 2019-11-05 | End: 2020-05-04 | Stop reason: ALTCHOICE

## 2020-01-07 RX ORDER — GABAPENTIN 100 MG/1
CAPSULE ORAL
Qty: 90 CAPSULE | Refills: 2 | Status: SHIPPED | OUTPATIENT
Start: 2020-01-07 | End: 2021-12-03 | Stop reason: ALTCHOICE

## 2020-05-04 ENCOUNTER — VIRTUAL VISIT (OUTPATIENT)
Dept: FAMILY MEDICINE CLINIC | Age: 35
End: 2020-05-04
Payer: COMMERCIAL

## 2020-05-04 PROBLEM — F41.8 DEPRESSION WITH ANXIETY: Status: ACTIVE | Noted: 2020-05-04

## 2020-05-04 PROBLEM — R35.0 URINARY FREQUENCY: Status: ACTIVE | Noted: 2020-05-04

## 2020-05-04 PROCEDURE — 99213 OFFICE O/P EST LOW 20 MIN: CPT | Performed by: FAMILY MEDICINE

## 2020-05-04 RX ORDER — ESCITALOPRAM OXALATE 10 MG/1
10 TABLET ORAL DAILY
Qty: 30 TABLET | Refills: 1 | Status: SHIPPED | OUTPATIENT
Start: 2020-05-04 | End: 2020-06-01 | Stop reason: SDUPTHER

## 2020-05-04 ASSESSMENT — PATIENT HEALTH QUESTIONNAIRE - PHQ9
1. LITTLE INTEREST OR PLEASURE IN DOING THINGS: 0
SUM OF ALL RESPONSES TO PHQ QUESTIONS 1-9: 0
2. FEELING DOWN, DEPRESSED OR HOPELESS: 0
SUM OF ALL RESPONSES TO PHQ QUESTIONS 1-9: 0
SUM OF ALL RESPONSES TO PHQ9 QUESTIONS 1 & 2: 0

## 2020-05-04 ASSESSMENT — ENCOUNTER SYMPTOMS
CONSTIPATION: 0
COUGH: 0
EYE REDNESS: 0
PHOTOPHOBIA: 0
EYE PAIN: 0
ABDOMINAL DISTENTION: 0
ANAL BLEEDING: 0
CHOKING: 0
RECTAL PAIN: 0
BLOOD IN STOOL: 0
SINUS PRESSURE: 0
CHANGE IN BOWEL HABIT: 0
VOMITING: 1
APNEA: 0
RHINORRHEA: 0
WHEEZING: 0
SORE THROAT: 0
BACK PAIN: 0
SWOLLEN GLANDS: 0
COLOR CHANGE: 0
VISUAL CHANGE: 0
VOICE CHANGE: 0
EYE ITCHING: 0
CHEST TIGHTNESS: 0
STRIDOR: 0
SHORTNESS OF BREATH: 0
EYE DISCHARGE: 0
FACIAL SWELLING: 0
ABDOMINAL PAIN: 0
TROUBLE SWALLOWING: 0
DIARRHEA: 0
NAUSEA: 1

## 2020-05-04 NOTE — PROGRESS NOTES
Subjective:     Patient ID:Megan Landin is a 29 y.o. female--this is a VV(COVID)    Fatigue   This is a recurrent problem. The current episode started more than 1 month ago. The problem occurs constantly. The problem has been gradually worsening. Associated symptoms include fatigue, nausea, urinary symptoms (freq--no possibilty of pregnancy) and vomiting. Pertinent negatives include no abdominal pain, anorexia, arthralgias, change in bowel habit, chest pain, chills, congestion, coughing, diaphoresis, fever, headaches, joint swelling, myalgias, neck pain, numbness, rash, sore throat, swollen glands, vertigo, visual change or weakness. Associated symptoms comments: Anxiety and depression--always ab to \"take care of it herself\" now worse and wants help--no suicidal ideation--has good support system but overwhelmed because these issues have resulted in loss of long term relationship and affects how she's dealing with her triplets. No Known Allergies    Current Outpatient Medications   Medication Sig Dispense Refill    gabapentin (NEURONTIN) 100 MG capsule Take 1 tab po TID. Start at 1 tab bed time, increase 1 tab BID in 3 days, then 1 tab TID. 90 capsule 2    amitriptyline (ELAVIL) 10 MG tablet Take 2 tablets at night 180 tablet 1    albuterol (PROVENTIL) (2.5 MG/3ML) 0.083% nebulizer solution Inhale 2.5 mg into the lungs      albuterol sulfate  (90 Base) MCG/ACT inhaler Inhale 2-4 puffs into the lungs      escitalopram (LEXAPRO) 10 MG tablet Take 1 tablet by mouth daily 30 tablet 1    budesonide-formoterol (SYMBICORT) 160-4.5 MCG/ACT AERO Inhale 2 puffs into the lungs 2 times daily Rinse mouth after use. (Patient not taking: Reported on 5/4/2020) 1 Inhaler 5    omeprazole (PRILOSEC) 20 MG delayed release capsule Take 1 capsule by mouth daily (with breakfast) 30 capsule 3     No current facility-administered medications for this visit.         Past Medical History:   Diagnosis Date    Asthma     Administered    DTaP 1985, 1985, 01/31/1986, 01/12/1987, 05/02/1990    Influenza Virus Vaccine 10/19/2017, 10/19/2018, 11/06/2019    MMR 10/18/1986, 08/12/1997    Polio IPV (IPOL) 1985, 1985, 01/31/1986, 01/27/1987    Tdap (Boostrix, Adacel) 09/20/2012    Varicella (Varivax) 05/09/1998       Review of Systems  Review of Systems   Constitutional: Positive for fatigue. Negative for activity change, appetite change, chills, diaphoresis, fever and unexpected weight change. HENT: Negative for congestion, dental problem, drooling, ear discharge, ear pain, facial swelling, hearing loss, mouth sores, nosebleeds, postnasal drip, rhinorrhea, sinus pressure, sneezing, sore throat, tinnitus, trouble swallowing and voice change. Eyes: Negative for photophobia, pain, discharge, redness, itching and visual disturbance. Respiratory: Negative for apnea, cough, choking, chest tightness, shortness of breath, wheezing and stridor. Cardiovascular: Negative for chest pain, palpitations and leg swelling. Gastrointestinal: Positive for nausea and vomiting. Negative for abdominal distention, abdominal pain, anal bleeding, anorexia, blood in stool, change in bowel habit, constipation, diarrhea and rectal pain. Genitourinary: Positive for difficulty urinating, frequency and urgency. Negative for dysuria, enuresis, flank pain, genital sores, hematuria, menstrual problem, vaginal bleeding and vaginal discharge. Musculoskeletal: Negative for arthralgias, back pain, gait problem, joint swelling, myalgias, neck pain and neck stiffness. Skin: Negative for color change, pallor, rash and wound. Neurological: Negative for dizziness, vertigo, tremors, seizures, syncope, facial asymmetry, speech difficulty, weakness, light-headedness, numbness and headaches. Hematological: Negative for adenopathy. Does not bruise/bleed easily.    Psychiatric/Behavioral: Positive for decreased concentration, dysphoric mood and sleep disturbance. Negative for agitation, behavioral problems, confusion, hallucinations, self-injury and suicidal ideas. The patient is nervous/anxious. The patient is not hyperactive. Objective:   Physical Exam  Physical Exam --unable to do--VV    Assessment and Plan:     Depression with anxiety  Worse--will start lexapro and will see Dr Gay Ortiz too-    Urinary frequency  Check u/a and c/s      This is a telehealth visit that was performed with the originating site at Patient Location: _home__________  and Provider Location of: __office________Verbal consent to participate in video visit was obtained. Pursuant to the emergency declaration under the [facility] waiver authority and the [employer] and [employer] Act, this Virtual Visit was conducted, with patient's consent, to reduce the patient's risk of exposure to COVID-19 and provide continuity of care for an established/new patient. Services were provided through a video synchronous discussion virtually to substitute for in-person clinic visit. I discussed with the patient the nature of our telehealth visits via interactive/real-time audio/video that:  - I would evaluate the patient and recommend diagnostics and treatments based on my assessment  - Our sessions are not being recorded and that personal health information is protected  - Our team would provide follow up care in person if/when the patient needs it.

## 2020-05-05 ENCOUNTER — VIRTUAL VISIT (OUTPATIENT)
Dept: RHEUMATOLOGY | Age: 35
End: 2020-05-05
Payer: COMMERCIAL

## 2020-05-05 PROCEDURE — 99213 OFFICE O/P EST LOW 20 MIN: CPT | Performed by: INTERNAL MEDICINE

## 2020-05-05 NOTE — PROGRESS NOTES
2020    TELEHEALTH EVALUATION -- Audio/Visual (During DPMII-81 public health emergency)    HPI:    Des Nichole (:  1985) has requested an audio/video evaluation for the following concern(s):  Bilateral hip pain in trochanteric bursa area, also has fibromyalgia. She continues to have discomfort on the trochanteric bursa area and gluteal area, has been doing exercises provided by physical therapy, that has helped with her symptoms. She was taking Lyrica, switch to gabapentin because of weight gain, is able to manage her symptoms on 100 mg gabapentin 3 times daily. She is tolerating medications well. Denies any swollen or inflamed joints. Overall remained stable. She also has thrombocytosis, followed by hematology. All other review of systems are negative. Review of Systems    Prior to Visit Medications    Medication Sig Taking? Authorizing Provider   escitalopram (LEXAPRO) 10 MG tablet Take 1 tablet by mouth daily  Bethel Trinidad MD   gabapentin (NEURONTIN) 100 MG capsule Take 1 tab po TID. Start at 1 tab bed time, increase 1 tab BID in 3 days, then 1 tab TID. Severiano Beauvais, MD   amitriptyline (ELAVIL) 10 MG tablet Take 2 tablets at night  Sushila Stafford MD   budesonide-formoterol (SYMBICORT) 160-4.5 MCG/ACT AERO Inhale 2 puffs into the lungs 2 times daily Rinse mouth after use. Patient not taking: Reported on 2020  Bethel Trinidad MD   omeprazole (PRILOSEC) 20 MG delayed release capsule Take 1 capsule by mouth daily (with breakfast)  Bethel Trinidad MD   albuterol (PROVENTIL) (2.5 MG/3ML) 0.083% nebulizer solution Inhale 2.5 mg into the lungs  Historical Provider, MD   albuterol sulfate  (90 Base) MCG/ACT inhaler Inhale 2-4 puffs into the lungs  Historical Provider, MD       Social History     Tobacco Use    Smoking status: Never Smoker    Smokeless tobacco: Never Used   Substance Use Topics    Alcohol use:  Yes     Alcohol/week: 1.0 standard drinks     Types: 1 months. No follow-ups on file. Jacobo Alcazar is a 29 y.o. female being evaluated by a Virtual Visit (video visit) encounter to address concerns as mentioned above. A caregiver was present when appropriate. Due to this being a TeleHealth encounter (During BYYPR-96 public health emergency), evaluation of the following organ systems was limited: Vitals/Constitutional/EENT/Resp/CV/GI//MS/Neuro/Skin/Heme-Lymph-Imm. Pursuant to the emergency declaration under the [address] [place] [address] authority and the [employer] and [employer] Act, this Virtual Visit was conducted with patient's (and/or legal guardian's) consent, to reduce the patient's risk of exposure to COVID-19 and provide necessary medical care. The patient (and/or legal guardian) has also been advised to contact this office for worsening conditions or problems, and seek emergency medical treatment and/or call 911 if deemed necessary. Patient identification was verified at the start of the visit: {YES    Services were provided through a video synchronous discussion virtually to substitute for in-person clinic visit. Patient and provider were located at their individual homes. --Chanel sIabel MD on 5/5/2020 at 12:59 PM    An electronic signature was used to authenticate this note.

## 2020-05-19 ENCOUNTER — VIRTUAL VISIT (OUTPATIENT)
Dept: PSYCHOLOGY | Age: 35
End: 2020-05-19
Payer: COMMERCIAL

## 2020-05-19 PROCEDURE — 90791 PSYCH DIAGNOSTIC EVALUATION: CPT | Performed by: PSYCHOLOGIST
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
## 2020-05-19 NOTE — PATIENT INSTRUCTIONS
Paulie Georges    \"Mindfulness is the basic human ability to be fully present, aware of where we are and what were doing, and not overly reactive or overwhelmed by whats going on around us. \"   -Mindful [place]    Paying attention on purpose  Mindfulness involves paying attention on purpose. Mindfulness involves a conscious direction of our awareness. This can mean purposefully directing our attention to the breath, or a particular emotion, or an activity as simple as eating. Doing so allows us to actively shape the mind. Paying attention in the present moment  Left to itself, the mind wanders through all kinds of thoughts -- including thoughts expressing anger, craving, depression, self-pity, and anxiety. As we indulge in these kinds of thoughts, we reinforce those emotions and cause ourselves to suffer. These thoughts usually center around the past or future. But the past no longer exists. The future is just a fantasy until it happens. The one moment we actually can experience -- the present moment -- is the one we seem most to avoid. By purposefully directing our awareness away from thoughts about the past or future and instead towards the [address] - our present moment experience - we decrease the effect of these thoughts on our lives. Paying attention non-judgmentally  Mindfulness is an emotionally non-reactive state. We don't  that this experience is good and that one is bad. Or, if we do make those judgments, we dont get upset in reaction to our experience. We simply notice it arising, observe it mindfully, and allow it to pass through us. When practicing mindfulness, we may be aware that certain experiences are pleasant and some are unpleasant, but on an emotional level we dont react. Resources  · \"Wherever You Go, There You Are: Mindfulness Meditation in Everyday Life\" - by Lester Rivas  · \"The Miracle of Mindfulness:  An Introduction to the Practice of Meditation\" - by Marino Mccloud Uzma  · \"The Mindfulness Solution: Everyday Practices for Everyday Problems\" - by Aaron Easton    Ways to Practice Mindfulness  · Pay attention to your breathing  · Take a mindful walk  · Eat mindfully  · Ground yourself in your five senses  · Journal  · Try dishwashing, cleaning and doing laundry a little bit slower than you usually do  · Meditate        . Diaphragmatic Breathing    \"The entire autonomic nervous system (and through it, our internal organs and glands) is largely driven by our breathing patterns. By changing our breathing we can influence millions of biochemical reactions in our body, producing more relaxing substances such as endorphins and fewer anxiety-producing ones like adrenaline and higher blood acidity. Mindfulness of the breath is so effective that it is common to all meditative and prayer traditions. \" Anxiety Fear & Breathing - Breathing. com    \"When overcoming high levels of anxiety, it is important to learn the techniques of correct breathing. Many people who live with high levels of anxiety are known to breathe through their chest. Shallow breathing through the chest means you are disrupting the balance of oxygen and carbon dioxide necessary to be in a relaxed state. This type of breathing will perpetuate the symptoms of anxiety. \" [employer]. com      What Is Diaphragmatic Breathing? Diaphragmatic breathing is a technique that helps you slow down your breathing when feeling stressed or anxious by using your diaphragm muscle to bring about a state of physiological relaxation.  babies naturally breathe this way, and singers, wind instrument players, and yoga practitioners also use this type of breathing. The diaphragm is a large muscle that rests across the bottom of your rib cage. When you inhale, the diaphragm muscle drops, opening up space so air can come in. When watching someone do this, it looks like your stomach is filling with air.  This type of breathing helps activate the part of your nervous system that controls relaxation. It can lead to decreased heart rate, blood pressure, decreased muscle tension, and overall feelings of relaxation. Why Is Diaphragmatic Breathing Important? ? Our breathing changes when we are feeling anxious. We tend to take short,  quick, shallow breaths, or even hyperventilate; this is called overbreathing.    ? It is a good idea to learn techniques for managing overbreathing, because this  type of breathing can actually make you feel even more anxious!    ? Diaphragmatic breathing is a great portable tool that you can use whenever you are feeling anxious. However, it does require some practice. Key point: Like other anxiety-management skills, the purpose of calm  breathing is not to avoid anxiety at all costs, but just to take the edge off or  help you ride out the feelings. Why Be Concerned With How Im Breathing?  To increase your awareness of the role that breathing plays in physical tension and your bodys stress response.  To lower your level of stress-related arousal and tension.  To give you a method of taking calm, relaxing breaths to break the cycle of increasing arousal during stressful situations. What Is the Best Way To Use Diaphragmatic Breathing Exercises?  Use diaphragmatic breathing frequently.  Take deep breaths at the first signs of stress, anxiety, physical tension, or other symptoms.  Schedule time for relaxation. How to Do It  Diaphragmatic breathing involves taking smooth, slow, and regular breaths. Sitting upright can increase the capacity of your lungs to fill with air. It is best to 'take the weight' off your shoulders by supporting your arms on the side-arms of a chair, or on your lap. You may also choose to practice breathing while lying down as well. 1. Take a slow breath in through the nose, breathing into your lower belly (for about 4 seconds)   2.  Exhale slowly through

## 2020-06-02 ENCOUNTER — VIRTUAL VISIT (OUTPATIENT)
Dept: PSYCHOLOGY | Age: 35
End: 2020-06-02
Payer: COMMERCIAL

## 2020-06-02 PROCEDURE — 90832 PSYTX W PT 30 MINUTES: CPT | Performed by: PSYCHOLOGIST

## 2020-06-02 NOTE — PROGRESS NOTES
Behavioral Health Consultation  Dasha Garcia Psy.D. Psychologist  6/2/2020  12-12:30 PM      Time spent with Patient: 30 minutes  This is patient's second [facility] appointment. Reason for Consult: Depression, anxiety  Referring Provider: Tom Jerez MD  [address] [place] [id number]    TELEHEALTH VISIT -- Audio/Visual (During LOVKN-79 public health emergency)    Pursuant to the emergency declaration under the [address] [place] [address], [facility] waiver authority and the [employer] and [employer] Act, this Virtual Visit was conducted, with patient's consent, to reduce the patient's risk of exposure to COVID-19 and provide continuity of care for an established patient. Services were provided through a video synchronous discussion virtually to substitute for in-person clinic visit. Pt gave verbal informed consent to participate in telehealth services. Conducted a risk-benefit analysis and determined that the patient's presenting problems are consistent with the use of telepsychology. Determined that the patient has sufficient knowledge and skills in the use of technology enabling them to adequately benefit from telepsychology. It was determined that this patient was able to be properly treated without an in-person session. Patient verified that they were currently located at the [facility] address that was provided during registration.     Verified the following information:  Patient's identification: Yes  Patient location: [address] [place] [address]  Patient's call back number: [phone]  Patient's emergency contact's name and number, as well as permission to contact them if needed:   Extended Emergency Contact Information  Primary Emergency Contact: José Manuel Quan  Address: [address]           Mary Ann Jarquin, [address] Phone: [phone]  Relation: Parent    Provider location: [place] calmer and brighter  Perception: within normal limits  Thought Content: within normal limits  Thought Process: logical, coherent and goal-directed  Insight: good  Judgment: intact  Ability to understand instructions: Yes  Ability to respond meaningfully: Yes  Morbid Ideation: no   Suicide Assessment: no suicidal ideation, plan, or intent  Homicidal Ideation: no      PHQ Scores 5/4/2020 1/11/2019 9/29/2017   PHQ2 Score 0 0 0   PHQ9 Score 0 0 0     Interpretation of Total Score Depression Severity: 1-4 = Minimal depression, 5-9 = Mild depression, 10-14 = Moderate depression, 15-19 = Moderately severe depression, 20-27 = Severe depression    Diagnosis:    1. Major depressive disorder, recurrent episode, moderate ([facility]    2. Anxiety disorder, unspecified type        Patient Active Problem List   Diagnosis    Left wrist pain    Reactive airway disease without complication    Severe headache    Neck pain    Bilateral hand numbness    Lymphadenopathy of head and neck    Thrombocytosis (HCC)    Pain of both hip joints    Peripheral tear of medial meniscus of left knee as current injury    Urinary frequency    Depression with anxiety         Plan:  Pt interventions:  Supportive techniques, Provided Psychoeducation re: shame, self-compassion, self-talk, gratitude journaling, Emphasized self-care as important for managing overall health and CBT to target balanced thinking. Pt Behavioral Change Plan:  Pt set the following goals:  1. Practice being mindful - paying attention to the present moment on purpose and in a nonjudgmental way  2. Practice diaphragmatic breathing throughout the day  3. Practice grounding exercises - noticing what your senses are experiencing in the moment  4. When you feel overwhelmed by emotions, start by naming what you're feeling (e.g., physical sensations, emotions). Then focus on using breathing and movement to shift your nervous system to a calmer place.   5. Learn more about self-compassion at www.self-compassion.org. Watch the video on self-compassion vs self-esteem. 6. When you notice negative self-talk, work on positive self-talk by telling yourself whatever you would tell someone closest to you    Pt scheduled F/U virtual visit in 2 weeks.

## 2020-06-17 ENCOUNTER — VIRTUAL VISIT (OUTPATIENT)
Dept: PSYCHOLOGY | Age: 35
End: 2020-06-17
Payer: COMMERCIAL

## 2020-06-17 PROCEDURE — 90832 PSYTX W PT 30 MINUTES: CPT | Performed by: PSYCHOLOGIST

## 2020-06-17 NOTE — PATIENT INSTRUCTIONS
Goals: 1. Practice being mindful - paying attention to the present moment on purpose and in a nonjudgmental way  2. Practice diaphragmatic breathing throughout the day  3. Practice grounding exercises - noticing what your senses are experiencing in the moment  4. When you feel overwhelmed by emotions, start by naming what you're feeling (e.g., physical sensations, emotions). Then focus on using breathing and movement to shift your nervous system to a calmer place. 5. Learn more about self-compassion at www.self-compassion.org. Watch the video on self-compassion vs self-esteem. 6. When you notice negative self-talk, work on positive self-talk by telling yourself whatever you would tell someone closest to you  7. Try guided meditation using an kannan like Head Space, Calm, or Ten Percent Happier: Meditation for the [employer]  It can be very helpful to use tools like relaxed breathing, muscle relaxation, and guided imagery/visualization to cope with stress, pain, anxiety and depression. Try different techniques to find the ones that work best for you. Below are 2 websites that have several breathing, relaxation, and visualization exercises that you can listen to and download for free.    NetworkAffair.tn. html  · Deep Breathing & Guided Relaxation Exercises (3)  · Guided Imagery/Visualization Exercises (5)  · Mindfulness & Meditation Exercises (3)  · Progressive Muscle Relaxation   · Soothing Instrumental Music (11)    [email or web address]  · Diaphragmatic Breathing   · Deep Breathing I   · Deep Breathing II   · Progressive Muscle Relaxation   · Guided Imagery: The Color Eight   · Guided Imagery:  [facility]   · Relaxing Phrases   · Just This Breath   · Increasing Awareness   · Sending Thoughts Away on Clouds  · Sending Thoughts Away on Leaves  · Sorting Into Boxes   -----------------------------------------------------  Below are several lengths (3, 5, 10, 15, 20 or 25 minutes) on a variety of topics. 10% Happier: Meditation for [employer]: iPhone  Cost: Monthly subscription starting at $9.99  This kannan was written by a [facility] and includes a variety of meditation teachers who teach meditation in an accessible way. Self-Help for Anxiety Management [facility])  Platform: iPhone & Android  Cost: Free  The Self-Help for Anxiety Management [facility]) kannan from the [employer] can help you regain control of your anxiety and emotions. Tell the kannan how youre feeling, how anxious you are, or how worried you are. Then let the kannans self-help features walk you through some calming or relaxation practices. If you want, you can connect with a social network of other [facility] users. Dont worry, the network isnt connected to larger networks like Twitter or [employer]. Stop Panic & Anxiety Help  Platform: Android  Cost: Free  If panic and anxiety attacks have a  on your life, this kannan might help you let them go. The Stop Panic & Anxiety Help Android kannan uses emotion and relaxation training audio tracks to help you fight your fears and find a state of calm. When youve overcome the attack, use the SpectraLinear journal to record what caused the attack and how you were able to get through it. Then use this journal to learn from your experiences and prepare for the future. I Can Be Fearless by [employer]  Platform: iPhone  Cost: Free  When you were younger, your parents might have told you that you could do anything you put your mind to. This kannan might not help you be an astronaut or a world famous actress, but it can help you break through your anxiety, fears, and worries to a place of calm and confidence. Open your Apple device and select what you want to be right now -- calm, motivated, and confident are among the options -- then let the audio hypnosis guide you through a session.   Anti-Anxiety   Platform: peaceful setting. Allow the sounds of nature to sweep you away from your worries in the comfort of your living room, office, or bedroom. Nature Sounds Relax and Sleep  Platform: Android  Cost: Free  If you find yourself longing for the sound of the ocean to help you relax, the Ronan Jacksonnifin and Sleep kannan is for you. Open this kannan whenever youre feeling anxious or stressed. You can select locations or sounds like the jungle, ocean, or thunder and slip away into a place of relaxation and comfort. If the sounds make you feel sleepy, even better. Use the kannan to doze off into a relaxing slumber  Calming Music to Simplicity  Platform: Android  Cost: Free  [employer] arent the only relaxing tunes in smartphone apps. Music, especially some traditional [employer] World [employer] music, can be relaxing and soothing. The Calming Music to Simplicity kannan contains nine traditional [employer] World [employer] music selections. Press play and let your worries melt away. Relax Ocean [place] Sleep by [employer]  Platform: Android  Cost: Free  Living far from a beach doesnt mean you have to be far from total relaxation. Slip on a set of headphones and drift into a distant sand-and-suds oasis. Whether youre trying to head off an anxiety attack or just need to get some good sleep after a few anxious days, the Relax Ocean Waves Sleep kannan helps you find a place of serenity.  --------------------------------------------------------------  Dorlene Furry Meditation Relaxation  Platform: Android  Cost: Free  Dorlene Furry is a traditional [facility] health system that brings together posture, breathing, and the mind to reduce anxiety. This Android kannan connects users with a library of relaxation videos that contain instructions for relaxing and clearing the mind. The videos are created by Dr. Analilia Adame, a psychologist with more than 20 years of experience.  In addition to viewing Dr. Vickie Ahn videos, you can read a variety of articles related to anxiety, meditation, and stress

## 2020-06-17 NOTE — PROGRESS NOTES
nervous system to a calmer place. 5. Learn more about self-compassion at www.self-compassion.org. Watch the video on self-compassion vs self-esteem. 6. When you notice negative self-talk, work on positive self-talk by telling yourself whatever you would tell someone closest to you  7. Try guided meditation using an kannan like Head Space, Calm, or Ten Percent Happier: Meditation for the Fidgety Skeptic    Pt scheduled F/U virtual visit in 2-3 weeks.

## 2020-07-06 ENCOUNTER — VIRTUAL VISIT (OUTPATIENT)
Dept: PSYCHOLOGY | Age: 35
End: 2020-07-06
Payer: COMMERCIAL

## 2020-07-06 PROCEDURE — 90832 PSYTX W PT 30 MINUTES: CPT | Performed by: PSYCHOLOGIST

## 2020-07-06 NOTE — PROGRESS NOTES
Behavioral Health Consultation  Chris Soriano Psy.D. Psychologist  7/6/2020  2:05-2:25 PM      Time spent with Patient: 20 minutes  This is patient's fourth [facility] appointment. Reason for Consult: Depression, anxiety  Referring Provider: Katie Aviles MD  [address] [place] [address] [address]    TELEHEALTH VISIT -- Audio/Visual (During BLZEJ-58 public health emergency)    Pursuant to the emergency declaration under the [facility], [facility] waiver authority and the [employer] and [employer] Act, this Virtual Visit was conducted, with patient's consent, to reduce the patient's risk of exposure to COVID-19 and provide continuity of care for an established patient. Services were provided through a video synchronous discussion virtually to substitute for in-person clinic visit. Pt gave verbal informed consent to participate in telehealth services. Conducted a risk-benefit analysis and determined that the patient's presenting problems are consistent with the use of telepsychology. Determined that the patient has sufficient knowledge and skills in the use of technology enabling them to adequately benefit from telepsychology. It was determined that this patient was able to be properly treated without an in-person session. Patient verified that they were currently located at the [facility] address that was provided during registration.     Verified the following information:  Patient's identification: Yes  Patient location: [address] [place] [address]  Patient's call back number: [phone]  Patient's emergency contact's name and number, as well as permission to contact them if needed:   Extended Emergency Contact Information  Primary Emergency Contact: José Manuel Quan  Address: [address]            Shiv Menjivar, [address] Phone: [phone]  Relation: Parent    Provider location: [place] meaningfully: Yes  Morbid Ideation: no   Suicide Assessment: no suicidal ideation, plan, or intent  Homicidal Ideation: no      PHQ Scores 5/4/2020 1/11/2019 9/29/2017   PHQ2 Score 0 0 0   PHQ9 Score 0 0 0     Interpretation of Total Score Depression Severity: 1-4 = Minimal depression, 5-9 = Mild depression, 10-14 = Moderate depression, 15-19 = Moderately severe depression, 20-27 = Severe depression    Diagnosis:    1. Major depressive disorder, recurrent episode, in partial remission with anxious distress [facility])        Patient Active Problem List   Diagnosis    Left wrist pain    Reactive airway disease without complication    Severe headache    Neck pain    Bilateral hand numbness    Lymphadenopathy of head and neck    Thrombocytosis (HCC)    Pain of both hip joints    Peripheral tear of medial meniscus of left knee as current injury    Urinary frequency    Depression with anxiety         Plan:  Pt interventions:  Supportive techniques, Emphasized self-care as important for managing overall health, CBT to target balanced thinking and Motivational Interviewing to increase patient confidence and compliance with adhering to behavioral change plan. Pt Behavioral Change Plan:  Pt set the following goals:  1. Practice being mindful - paying attention to the present moment on purpose and in a nonjudgmental way  2. Practice diaphragmatic breathing throughout the day  3. Practice grounding exercises - noticing what your senses are experiencing in the moment  4. When you feel overwhelmed by emotions, start by naming what you're feeling (e.g., physical sensations, emotions). Then focus on using breathing and movement to shift your nervous system to a calmer place. 5. Learn more about self-compassion at www.self-compassion.org. Watch the video on self-compassion vs self-esteem.   6. When you notice negative self-talk, work on positive self-talk by telling yourself whatever you would tell someone closest to you  7. Try guided meditation using an kannan like Head Space, Calm, or Ten Percent Happier: Meditation for the [employer]  8. Consider learning more about Rl Servin's work - MADELINE talks on vulnerability and shame, Net[employer]elia special called Call to Nery Contreras, books such as The Gifts of Imperfection, The Gifts of Imperfect Parenting, Daring Greatly, and Rising Strong, and podcast called Unlocking Us. Pt scheduled F/U virtual visit in 4 weeks.

## 2020-07-06 NOTE — PATIENT INSTRUCTIONS
Goals: 1. Practice being mindful - paying attention to the present moment on purpose and in a nonjudgmental way  2. Practice diaphragmatic breathing throughout the day  3. Practice grounding exercises - noticing what your senses are experiencing in the moment  4. When you feel overwhelmed by emotions, start by naming what you're feeling (e.g., physical sensations, emotions). Then focus on using breathing and movement to shift your nervous system to a calmer place. 5. Learn more about self-compassion at www.self-compassion.org. Watch the video on self-compassion vs self-esteem. 6. When you notice negative self-talk, work on positive self-talk by telling yourself whatever you would tell someone closest to you  7. Continue practicing guided meditation using an kannan like Head Space, Calm, or Ten Percent Happier: Meditation for the [employer]  8. Consider learning more about Joel Servin's work - MADELINE talks on vulnerability and shame, Netflix special called Call to Nery Contreras, books such as The Gifts of Imperfection, The Gifts of Imperfect Parenting, Daring Greatly, and Rising Strong, and podcast called Unlocking Us.

## 2020-08-04 ENCOUNTER — VIRTUAL VISIT (OUTPATIENT)
Dept: PSYCHOLOGY | Age: 35
End: 2020-08-04
Payer: COMMERCIAL

## 2020-08-04 PROCEDURE — 90832 PSYTX W PT 30 MINUTES: CPT | Performed by: PSYCHOLOGIST

## 2020-08-04 NOTE — PROGRESS NOTES
OH      S:  Pt reported that she's doing well. Mood has remained around 8/10 lately. Journaling and the guided meditation have been helpful. Enjoyed a recent vacation, felt good to get away. Pt still wants to get physically healthy. Wants to exercise first thing in the morning or in the evenings when her kids aren't with her. Not working out as much as she'd like bc she's so busy with work, even in the evenings. Pt has asked for direct feedback from her boss so she won't have to worry. Pt knows she's handling unknowns and worries more effectively but she has occasionally lost sleep over it. Her kids will being attending in-person school 2 days/week. Pt's court date to finalize her divorce will occur at the end of the month. Pt has some guilt about how the divorce is affecting her kids, but overall she believes everyone is handling it well. O:  Pt's distress has remained lower. Benefiting from ongoing focus on self-care, positive self-talk, medication, journaling, and mindfulness strategies. Processed pt's feelings about her impending divorce. Gently challenged maladaptive thoughts. Reinforced her ongoing efforts towards improvement in her physical health. No safety concerns at this time.       A:  MSE:  Appearance: good hygiene  and appropriate attire  Attitude: cooperative, friendly and mild distress re: her divorce  Consciousness: alert  Orientation: oriented to person, place, time, general circumstance  Memory: recent and remote memory intact  Attention/Concentration: intact during session  Psychomotor Activity: normal  Eye Contact: normal  Speech: normal rate and volume, well-articulated  Mood: euthymic  Affect: congruent  Perception: within normal limits  Thought Content: within normal limits  Thought Process: logical, coherent and goal-directed  Insight: good  Judgment: intact  Ability to understand instructions: Yes  Ability to respond meaningfully: Yes  Morbid Ideation: no   Suicide Assessment: no MADELINE talks on vulnerability and shame, Netflanthony special called Call to [address], books such as The Gifts of Imperfection, The Gifts of Imperfect Parenting, Daring Greatly, and Rising Strong, and podcast called Unlocking Us. Pt scheduled F/U virtual visit in 4 weeks.

## 2020-08-04 NOTE — PATIENT INSTRUCTIONS
Goals: 1. Practice being mindful - paying attention to the present moment on purpose and in a nonjudgmental way  2. Practice diaphragmatic breathing throughout the day  3. Practice grounding exercises - noticing what your senses are experiencing in the moment  4. When you feel overwhelmed by emotions, start by naming what you're feeling (e.g., physical sensations, emotions). Then focus on using breathing and movement to shift your nervous system to a calmer place. 5. Learn more about self-compassion at www.self-compassion.org. Watch the video on self-compassion vs self-esteem. 6. When you notice negative self-talk, work on positive self-talk by telling yourself whatever you would tell someone closest to you  7. Continue practicing guided meditation   8. Consider learning more about Eliana Servin's work - MADELINE talks on vulnerability and shame, Netflix special called Call to Ozarkbecca Contreras, books such as The Gifts of Imperfection, The Gifts of Imperfect Parenting, Daring Greatly, and Rising Strong, and podcast called Unlocking Us.

## 2020-09-01 ENCOUNTER — VIRTUAL VISIT (OUTPATIENT)
Dept: PSYCHOLOGY | Age: 35
End: 2020-09-01
Payer: COMMERCIAL

## 2020-09-01 PROCEDURE — 90832 PSYTX W PT 30 MINUTES: CPT | Performed by: PSYCHOLOGIST

## 2020-09-01 NOTE — PROGRESS NOTES
Behavioral Health Consultation  Kenji Aguayo Psy.D. Psychologist  9/1/2020  2-2:25 PM      Time spent with Patient: 25 minutes  This is patient's sixth [facility] appointment. Reason for Consult: Depression, anxiety  Referring Provider: Dav Gonzalez MD  [address] [id number]    TELEHEALTH VISIT -- Audio/Visual (During EWEEL-22 public health emergency)    Pursuant to the emergency declaration under the [address] [place] [address], [facility] waiver authority and the [employer] Act, this Virtual Visit was conducted, with patient's consent, to reduce the patient's risk of exposure to COVID-19 and provide continuity of care for an established patient. Services were provided through a video synchronous discussion virtually to substitute for in-person clinic visit. Pt gave verbal informed consent to participate in telehealth services. Conducted a risk-benefit analysis and determined that the patient's presenting problems are consistent with the use of telepsychology. Determined that the patient has sufficient knowledge and skills in the use of technology enabling them to adequately benefit from telepsychology. It was determined that this patient was able to be properly treated without an in-person session. Patient verified that they were currently located at the [facility] address that was provided during registration.     Verified the following information:  Patient's identification: Yes  Patient location: [address] [place] [address]  Patient's call back number: [phone]  Patient's emergency contact's name and number, as well as permission to contact them if needed:   Extended Emergency Contact Information  Primary Emergency Contact: José Manuel Quan  Address: Jr Reddy [address] [place], [place] [address], [address] Phone: [phone]  Relation: Parent    Provider location: [place]      S:  Pt reported that she's doing well. Her kids are back in school 2 days/week. Pt is managing work stress as well as she can. Pt is setting limits to end her work-day. Going on walks and hikes at night, which helps her be more active. She started a \"28-day challenge\" yesterday to keep her accountable and improve her mindset re: her health. Requires her to do meditation and write affirmations. Benefiting from the female support community aspect. Working toward being kinder to herself, accepting herself as she is, just [place] ([place]). \" Sometimes able to recognize self-judgment as it's happening. Pt is no longer having panic attacks. She has occasional anxiety that just makes her quiet and thoughtful but she's able to move through it. O:  Pt's distress has remained lower. Reinforced her ongoing efforts towards self-care. Discussed her hopes and expectations for the 28-day challenge. Explored relapse prevention techniques. Normalized and validated her concerns. No safety concerns at this time.       A:  MSE:  Appearance: good hygiene  and appropriate attire  Attitude: cooperative and friendly  Consciousness: alert  Orientation: oriented to person, place, time, general circumstance  Memory: recent and remote memory intact  Attention/Concentration: intact during session  Psychomotor Activity: normal  Eye Contact: normal  Speech: normal rate and volume, well-articulated  Mood: euthymic  Affect: congruent  Perception: within normal limits  Thought Content: within normal limits  Thought Process: logical, coherent and goal-directed  Insight: good  Judgment: intact  Ability to understand instructions: Yes  Ability to respond meaningfully: Yes  Morbid Ideation: no   Suicide Assessment: no suicidal ideation, plan, or intent  Homicidal Ideation: no      PHQ Scores 5/4/2020 1/11/2019 9/29/2017   PHQ2 Score 0 0 0   PHQ9 Score 0 0 0     Interpretation of Total Score Depression Severity: 1-4 = Minimal depression, 5-9 = Mild depression, 10-14 = Moderate depression, 15-19 = Moderately severe depression, 20-27 = Severe depression    Diagnosis:    1. Major depressive disorder, recurrent episode, in partial remission with anxious distress [facility])        Patient Active Problem List   Diagnosis    Left wrist pain    Reactive airway disease without complication    Severe headache    Neck pain    Bilateral hand numbness    Lymphadenopathy of head and neck    Thrombocytosis (HCC)    Pain of both hip joints    Peripheral tear of medial meniscus of left knee as current injury    Urinary frequency    Depression with anxiety         Plan:  Pt interventions:  Supportive techniques, Emphasized self-care as important for managing overall health and CBT to target balanced thinking. Pt Behavioral Change Plan:  Pt set the following goals:  1. Practice being mindful - paying attention to the present moment on purpose and in a nonjudgmental way  2. Practice diaphragmatic breathing throughout the day  3. Practice grounding exercises - noticing what your senses are experiencing in the moment  4. When you feel overwhelmed by emotions, start by naming what you're feeling (e.g., physical sensations, emotions). Then focus on using breathing and movement to shift your nervous system to a calmer place. 5. Learn more about self-compassion at www.self-compassion.org. Watch the video on self-compassion vs self-esteem. 6. When you notice negative self-talk, work on positive self-talk by telling yourself whatever you would tell someone closest to you  7. Try guided meditation using an kannan like Head Space, Calm, or Ten Percent Happier: Meditation for the [employer]  8. Consider learning more about Christine Servin's work - MADELINE talks on vulnerability and shame, Netflix special called Call to [facility], books such as The Gifts of Imperfection, The Gifts of Imperfect Parenting, Daring Greatly, and Rising Strong, and podcast called Unlocking Us.   9. Consider creating a vision board to maintain focus on your goals    Pt scheduled F/U virtual visit in 1 month.

## 2020-09-01 NOTE — PATIENT INSTRUCTIONS
Goals: 1. Practice being mindful - paying attention to the present moment on purpose and in a nonjudgmental way  2. Practice diaphragmatic breathing throughout the day  3. Practice grounding exercises - noticing what your senses are experiencing in the moment  4. When you feel overwhelmed by emotions, start by naming what you're feeling (e.g., physical sensations, emotions). Then focus on using breathing and movement to shift your nervous system to a calmer place. 5. Learn more about self-compassion at www.self-compassion.org. Watch the video on self-compassion vs self-esteem. 6. When you notice negative self-talk, work on positive self-talk by telling yourself whatever you would tell someone closest to you  7. Try guided meditation using an kannan like Head Space, Calm, or Ten Percent Happier: Meditation for the [employer]  8. Consider learning more about Roque Servin's work - MADELINE talks on vulnerability and shame, Netflix special called Call to Nery Contreras, books such as The Gifts of Imperfection, The Gifts of Imperfect Parenting, Daring Greatly, and Rising Strong, and podcast called Unlocking Us.   9. Consider creating a vision board to maintain focus on your goals

## 2020-10-01 ENCOUNTER — VIRTUAL VISIT (OUTPATIENT)
Dept: PSYCHOLOGY | Age: 35
End: 2020-10-01
Payer: COMMERCIAL

## 2020-10-01 PROCEDURE — 90832 PSYTX W PT 30 MINUTES: CPT | Performed by: PSYCHOLOGIST

## 2020-10-01 NOTE — PROGRESS NOTES
Behavioral Health Consultation  Mily Itz Brady Psychologist  10/1/2020  2-2:25 PM      Time spent with Patient: 25 minutes  This is patient's seventh [facility] appointment. Reason for Consult: Depression, anxiety  Referring Provider: Anthony Medina MD  [address] [place] [address] [phone]    TELEHEALTH VISIT -- Audio/Visual (During RAZJL-04 public health emergency)    Pursuant to the emergency declaration under the [address] [place] [address], [facility] waiver authority and the [employer] Act, this Virtual Visit was conducted, with patient's consent, to reduce the patient's risk of exposure to COVID-19 and provide continuity of care for an established patient. Services were provided through a video synchronous discussion virtually to substitute for in-person clinic visit. Pt gave verbal informed consent to participate in telehealth services. Conducted a risk-benefit analysis and determined that the patient's presenting problems are consistent with the use of telepsychology. Determined that the patient has sufficient knowledge and skills in the use of technology enabling them to adequately benefit from telepsychology. It was determined that this patient was able to be properly treated without an in-person session. Patient verified that they were currently located at the [facility] address that was provided during registration.     Verified the following information:  Patient's identification: Yes  Patient location: [address] [place] [address]  Patient's call back number: [phone]  Patient's emergency contact's name and number, as well as permission to contact them if needed:   Extended Emergency Contact Information  Primary Emergency Contact: José Manuel Quan  Address: Jr Reddy [address] [place] [address], [address] Phone: [phone]  Relation: Parent    Provider location: [address] [place] [address] [place]      S:  Pt reported that she's doing well. Pt learned that her grandmother has stage 4 cirrhosis on the same day that her best friend's grandmother passed away. Dealing with family dynamics, as pt's parents are less available to help w/ pt's kids. Pt hasn't been exercising as much as she wants. The support group has helped with mindfulness and increasing her focus on self-compassion. Drinking full-meal nutrition shakes for breakfast and lunch plus a balanced dinner. She plans to start an online program in [employer] in January to continue growing in her career. Pt reflected on her experience of growing up with an older brother who deals w/ schizophrenia and substance abuse. Pt is aware that this experience, which began when pt was in , contributed to her tendency to try to manage her issues independently, without asking for help. She tries to always make herself available to her children, especially her daughter who also suffers from anxiety. O:  Pt's distress has remained lower, despite abovementioned stressors. Reinforced her ongoing efforts towards self-care. Processed how early life experiences within her family of origin have impacted pt and reinforced her efforts to avoid repeating similar cycles with her kids. Normalized and validated her concerns. No safety concerns at this time.       A:  MSE:  Appearance: good hygiene  and appropriate attire  Attitude: cooperative and friendly  Consciousness: alert  Orientation: oriented to person, place, time, general circumstance  Memory: recent and remote memory intact  Attention/Concentration: intact during session  Psychomotor Activity: normal  Eye Contact: normal  Speech: normal rate and volume, well-articulated  Mood: euthymic  Affect: congruent  Perception: within normal limits  Thought Content: within normal limits  Thought Process: logical, coherent and goal-directed  Insight: good  Judgment: intact  Ability to understand instructions: Yes  Ability to respond meaningfully: Yes  Morbid Ideation: no   Suicide Assessment: no suicidal ideation, plan, or intent  Homicidal Ideation: no      PHQ Scores 5/4/2020 1/11/2019 9/29/2017   PHQ2 Score 0 0 0   PHQ9 Score 0 0 0     Interpretation of Total Score Depression Severity: 1-4 = Minimal depression, 5-9 = Mild depression, 10-14 = Moderate depression, 15-19 = Moderately severe depression, 20-27 = Severe depression    Diagnosis:    1. Major depressive disorder, recurrent episode, in partial remission with anxious distress [facility])        Patient Active Problem List   Diagnosis    Left wrist pain    Reactive airway disease without complication    Severe headache    Neck pain    Bilateral hand numbness    Lymphadenopathy of head and neck    Thrombocytosis (HCC)    Pain of both hip joints    Peripheral tear of medial meniscus of left knee as current injury    Urinary frequency    Depression with anxiety         Plan:  Pt interventions:  Supportive techniques, Emphasized self-care as important for managing overall health and CBT to target balanced thinking. Pt Behavioral Change Plan:  Pt set the following goals:  1. Practice being mindful - paying attention to the present moment on purpose and in a nonjudgmental way  2. Practice diaphragmatic breathing throughout the day  3. Practice grounding exercises - noticing what your senses are experiencing in the moment  4. When you feel overwhelmed by emotions, start by naming what you're feeling (e.g., physical sensations, emotions). Then focus on using breathing and movement to shift your nervous system to a calmer place. 5. Learn more about self-compassion at www.self-compassion.org. Watch the video on self-compassion vs self-esteem. 6. When you notice negative self-talk, work on positive self-talk by telling yourself whatever you would tell someone closest to you  7.  Try guided meditation using an kannan like [place], Calm, or Ten Percent Happier: Meditation for the [employer]  8. Consider learning more about Leslee Breaux Gareth's work - MADELINE talks on vulnerability and shame, Netflix special called Call to Nery Contreras, books such as The Gifts of Imperfection, The Gifts of Imperfect Parenting, Daring Greatly, and Rising Strong, and podcast called Unlocking Us. 9. Consider creating a vision board to maintain focus on your goals    Pt scheduled F/U virtual visit in 1 month.

## 2020-10-01 NOTE — PATIENT INSTRUCTIONS
Goals: 1. Practice being mindful - paying attention to the present moment on purpose and in a nonjudgmental way  2. Practice diaphragmatic breathing throughout the day  3. Practice grounding exercises - noticing what your senses are experiencing in the moment  4. When you feel overwhelmed by emotions, start by naming what you're feeling (e.g., physical sensations, emotions). Then focus on using breathing and movement to shift your nervous system to a calmer place. 5. Learn more about self-compassion at www.self-compassion.org. Watch the video on self-compassion vs self-esteem. 6. When you notice negative self-talk, work on positive self-talk by telling yourself whatever you would tell someone closest to you  7. Try guided meditation using an kannan like Head Space, Calm, or Ten Percent Happier: Meditation for the [employer]  8. Consider learning more about Caren Servin's work - MADELINE talks on vulnerability and shame, Netflix special called Call to Nery Contreras, books such as The Gifts of Imperfection, The Gifts of Imperfect Parenting, Daring Greatly, and Rising Strong, and podcast called Unlocking Us.   9. Consider creating a vision board to maintain focus on your goals

## 2020-10-06 ENCOUNTER — NURSE TRIAGE (OUTPATIENT)
Dept: OTHER | Facility: CLINIC | Age: 35
End: 2020-10-06

## 2020-10-06 NOTE — TELEPHONE ENCOUNTER
Reason for Disposition   Depression is worsening (e.g.,sleeping poorly, less able to do activities of daily living)    Answer Assessment - Initial Assessment Questions  1. CONCERN: \"What happened that made you call today? \"      medical issues , states overwhelmed    2. DEPRESSION SYMPTOM SCREENING: \"How are you feeling overall? \" (e.g., decreased energy, increased sleeping or difficulty sleeping, difficulty concentrating, feelings of sadness, guilt, hopelessness, or worthlessness)  Not difficulty, feeling sad  And overwhelmed    3. RISK OF HARM - SUICIDAL IDEATION:  \"Do you ever have thoughts of hurting or killing yourself? \"  (e.g., yes, no, no but preoccupation with thoughts about death) states no      - INTENT:  \"Do you have thoughts of hurting or killing yourself right NOW? \" (e.g., yes, no, N/A) no      - PLAN: \"Do you have a specific plan for how you would do this? \" (e.g., gun, knife, overdose, no plan, N/A)   no    4. RISK OF HARM - HOMICIDAL IDEATION:  \"Do you ever have thoughts of hurting or killing someone else? \"  (e.g., yes, no, no but preoccupation with thoughts about death) states no      - INTENT:  \"Do you have thoughts of hurting or killing someone right NOW? \" (e.g., yes, no, N/A) no      - PLAN: \"Do you have a specific plan for how you would do this? \" (e.g., gun, knife, no plan, N/A)   No    5. FUNCTIONAL IMPAIRMENT: \"How have things been going for you overall in your life? Have you had any more difficulties than usual doing your normal daily activities? \"  (e.g., better, same, worse; self-care, school, work, interactions)     Not good she states relationship ended because of her health issues    6. SUPPORT: \"Who is with you now? \" \"Who do you live with?\" \"Do you have family or friends nearby who you can talk to?\"       Alone now, 3 children, yes talking to a friend all morning    7. THERAPIST: \"Do you have a counselor or therapist? Name? \"    Dr. Jaylyn Potter   yes    8.  STRESSORS: \"Has there been any new stress or recent changes in your life? \"  Relationship break up    9. DRUG ABUSE/ALCOHOL: \"Do you drink alcohol or use any illegal drugs? \"       None she states     10. OTHER: \"Do you have any other health or medical symptoms right now? \" (e.g., fever)  Normal hip pain    11. PREGNANCY: \"Is there any chance you are pregnant? \" \"When was your last menstrual period? \"  No, now    Protocols used: DEPRESSION-ADULT-[place]    Call transferred to [facility]    Attention Provider: Thank you for allowing me to participate in the care of your patient. The patient was connected to triage in response to information provided to the [facility]. Please do not respond through this encounter as the response is not directed to a shared pool.

## 2020-10-07 ENCOUNTER — OFFICE VISIT (OUTPATIENT)
Dept: FAMILY MEDICINE CLINIC | Age: 35
End: 2020-10-07
Payer: COMMERCIAL

## 2020-10-07 VITALS
HEIGHT: 65 IN | OXYGEN SATURATION: 98 % | HEART RATE: 101 BPM | BODY MASS INDEX: 31.96 KG/M2 | SYSTOLIC BLOOD PRESSURE: 122 MMHG | DIASTOLIC BLOOD PRESSURE: 86 MMHG | WEIGHT: 191.8 LBS

## 2020-10-07 PROBLEM — R41.3 MEMORY LOSS OF UNKNOWN CAUSE: Status: ACTIVE | Noted: 2020-10-07

## 2020-10-07 PROBLEM — F41.9 ANXIETY: Status: ACTIVE | Noted: 2017-05-26

## 2020-10-07 PROCEDURE — 99213 OFFICE O/P EST LOW 20 MIN: CPT | Performed by: NURSE PRACTITIONER

## 2020-10-07 PROCEDURE — 90686 IIV4 VACC NO PRSV 0.5 ML IM: CPT | Performed by: NURSE PRACTITIONER

## 2020-10-07 PROCEDURE — 90471 IMMUNIZATION ADMIN: CPT | Performed by: NURSE PRACTITIONER

## 2020-10-07 NOTE — ASSESSMENT & PLAN NOTE
In review of her medications I suspect her memory loss is secondary here her use of gabapentin 3 times daily. I recommend she stop taking it during the day but she may take 100 to 300 mg at bedtime as needed to control her pain. This may help clear her up during the day. Her anxiety is significant.   I have requested that she reconnect with Dr. Mami Jaimes to discuss this
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
## 2020-10-12 LAB
BASOPHILS ABSOLUTE: 0.1 K/UL (ref 0–0.2)
BASOPHILS RELATIVE PERCENT: 1.1 %
EOSINOPHILS ABSOLUTE: 0.2 K/UL (ref 0–0.6)
EOSINOPHILS RELATIVE PERCENT: 3.7 %
HCT VFR BLD CALC: 40.1 % (ref 36–48)
HEMOGLOBIN: 13.4 G/DL (ref 12–16)
LYMPHOCYTES ABSOLUTE: 1.2 K/UL (ref 1–5.1)
LYMPHOCYTES RELATIVE PERCENT: 19.9 %
MCH RBC QN AUTO: 29.1 PG (ref 26–34)
MCHC RBC AUTO-ENTMCNC: 33.5 G/DL (ref 31–36)
MCV RBC AUTO: 87.1 FL (ref 80–100)
MONOCYTES ABSOLUTE: 0.4 K/UL (ref 0–1.3)
MONOCYTES RELATIVE PERCENT: 6.5 %
NEUTROPHILS ABSOLUTE: 4.1 K/UL (ref 1.7–7.7)
NEUTROPHILS RELATIVE PERCENT: 68.8 %
PDW BLD-RTO: 15.2 % (ref 12.4–15.4)
PLATELET # BLD: 442 K/UL (ref 135–450)
PMV BLD AUTO: 8.3 FL (ref 5–10.5)
RBC # BLD: 4.61 M/UL (ref 4–5.2)
TSH SERPL DL<=0.05 MIU/L-ACNC: 1.07 UIU/ML (ref 0.27–4.2)
WBC # BLD: 6 K/UL (ref 4–11)

## 2020-10-14 ENCOUNTER — OFFICE VISIT (OUTPATIENT)
Dept: RHEUMATOLOGY | Age: 35
End: 2020-10-14
Payer: COMMERCIAL

## 2020-10-14 VITALS — HEIGHT: 65 IN | TEMPERATURE: 97.9 F | BODY MASS INDEX: 31.82 KG/M2 | WEIGHT: 191 LBS

## 2020-10-14 PROCEDURE — 99214 OFFICE O/P EST MOD 30 MIN: CPT | Performed by: INTERNAL MEDICINE

## 2020-10-14 RX ORDER — TIZANIDINE 2 MG/1
TABLET ORAL
Qty: 60 TABLET | Refills: 0 | Status: SHIPPED | OUTPATIENT
Start: 2020-10-14 | End: 2021-12-03 | Stop reason: ALTCHOICE

## 2020-10-14 NOTE — PROGRESS NOTES
[address] [place] [address] ) [phone] (F)      Dear Dr. Kang Toledo MD:  Please find Rheumatology assessment. Thank you for giving me the opportunity to be involved in Jo Quan's care and I look forward following Megan along with you. If you have any questions or concerns please feel free to reach me. Note is transcribed using voice recognition software. Inadvertent computerized transcription errors may be present. Patient identification: Jo Quan,: 1985,35 y.o. Sex: female     A/P  Jo was seen today for follow-up. Diagnoses and all orders for this visit:    Fibromyalgia    Other orders  -     tiZANidine (ZANAFLEX) 2 MG tablet; mary 1 to 2 tab at bed time only. Persistent symptoms mainly on the outer aspect of her hips as well as trapezius, also battles with insomnia. Gabapentin makes her foggy. Had weight gain with Lyrica. No evidence of systemic inflammatory arthritis. Thrombocytosis-appears to be benign, long-standing-for at least since ,  is not the cause or effect of musculoskeletal pain. Has normal sedimentation rate. RISHABH was negative. Plan-  Recommend that she takes gabapentin at bedtime, may take up to 300 mg. Tizanidine for muscle spasm as needed. Stressed the importance of physical reconditioning, daily exercises, hydration to help with musculoskeletal discomfort. Call us with any changes in her symptoms otherwise follow-up in 6 months. Patient indicates understanding and agrees with the management plan.   I reviewed patient's history, referral documents and electronic medical records including primary provider is in neurology notes from different dates. #######################################################################    Subjective-  Follow-up for fibromyalgia causing chronic hyperesthesias and pain. She continues to have symptoms-mainly stiffness in her trapezius area, trochanteric bursa area and lately noticing muscle cramps in her Legs, Some Nights the Cramps Are so Bad That She Is Not Able to Sleep Well. Gabapentin causes fogginess during the daytime. Lyrica caused weight gain. She denies any swollen or inflamed joints. No focal muscle weakness. Denies any rashes, photosensitivity, respiratory GI/ symptoms. All other review of systems are negative. Past Medical History:   Diagnosis Date    Asthma     Reactive airway disease without complication      Past Surgical History:   Procedure Laterality Date     SECTION      HYSTEROSCOPY      LAPAROSCOPY      OVARIAN CYST REMOVAL Right 2016    TUBAL LIGATION       Social History     Socioeconomic History    Marital status:      Spouse name: Not on file    Number of children: 3    Years of education: Not on file    Highest education level: Not on file   Occupational History    Occupation: [employer]    Occupation: Supervisor     Comment: Ins. Auth. Social Needs    Financial resource strain: Not on file    Food insecurity     Worry: Not on file     Inability: Not on file    Transportation needs     Medical: Not on file     Non-medical: Not on file   Tobacco Use    Smoking status: Never Smoker    Smokeless tobacco: Never Used   Substance and Sexual Activity    Alcohol use:  Yes     Alcohol/week: 1.0 standard drinks     Types: 1 Glasses of wine per week    Drug use: No    Sexual activity: Yes     Partners: Male   Lifestyle    Physical activity     Days per week: Not on file     Minutes per session: Not on file    Stress: Not on file   Relationships    Social connections     Talks on phone: Not on file     Gets together: Not on file     Attends Restorationism service: Not on file     Active member of club or organization: Not on file     Attends meetings of clubs or organizations: Not on file     Relationship status: Not on file    Intimate partner violence     Fear of current or ex partner: Not on file     Emotionally abused: Not on file     Physically abused: Not on file     Forced sexual activity: Not on file   Other Topics Concern    Not on file   Social History Narrative    --raising 3 by self--ex helps    Gym 4 x week    Works 36--       No  family history of autoimmune diseases    Current Outpatient Medications   Medication Sig Dispense Refill    tiZANidine (ZANAFLEX) 2 MG tablet mary 1 to 2 tab at bed time only. 60 tablet 0    escitalopram (LEXAPRO) 10 MG tablet Take 1 tablet by mouth daily 90 tablet 3    gabapentin (NEURONTIN) 100 MG capsule Take 1 tab po TID. Start at 1 tab bed time, increase 1 tab BID in 3 days, then 1 tab TID. 90 capsule 2    omeprazole (PRILOSEC) 20 MG delayed release capsule Take 1 capsule by mouth daily (with breakfast) 30 capsule 3    albuterol (PROVENTIL) (2.5 MG/3ML) 0.083% nebulizer solution Inhale 2.5 mg into the lungs      albuterol sulfate  (90 Base) MCG/ACT inhaler Inhale 2-4 puffs into the lungs       No current facility-administered medications for this visit. No Known Allergies    PHYSICAL EXAM:    Vitals:    Temp 97.9 °F (36.6 °C) (Skin)   Ht 5' 5\" (1.651 m)   Wt 191 lb (86.6 kg)   BMI 31.78 kg/m²   General appearance/ Psychiatric: well nourished, and well groomed, normal judgement, alert, appears stated age and cooperative. MKS: Localized tenderness in trapezius area as well as bilateral trochanteric bursa area, otherwise I do not appreciate any tender, swollen or inflamed joints in the upper or lower extremities. Subjective calf cramps and spasms bilaterally.   Normal gait and muscle strength in upper and lower extremities bilaterally. Skin: No rashes, no induration or skin thickening or nodules. No evidence ischemia or deformities noted in digits or nails. HEENT: Normal lids, lacrimal glands and pupils. No oral or nasal ulcers. Salivary glands reveal no evidence of abnormality. External inspection of the ears and nose within normal limits. DATA:   Lab Results   Component Value Date    WBC 6.0 10/12/2020    HGB 13.4 10/12/2020    HCT 40.1 10/12/2020    MCV 87.1 10/12/2020     10/12/2020         Chemistry        Component Value Date/Time     (L) 05/08/2020 0724    K 4.2 05/08/2020 0724    CL 98 (L) 05/08/2020 0724    CO2 20 (L) 05/08/2020 0724    BUN 10 05/08/2020 0724    CREATININE 0.9 05/08/2020 0724        Component Value Date/Time    CALCIUM 9.6 05/08/2020 0724    ALKPHOS 72 05/08/2020 0724    AST 13 (L) 05/08/2020 0724    ALT 7 (L) 05/08/2020 0724    BILITOT 0.7 05/08/2020 0724          Lab Results   Component Value Date    TSH 1.07 10/12/2020     Lab Results   Component Value Date    VITD25 21.0 (L) 05/19/2018       A/P- See above.

## 2020-10-20 ENCOUNTER — VIRTUAL VISIT (OUTPATIENT)
Dept: PSYCHOLOGY | Age: 35
End: 2020-10-20
Payer: COMMERCIAL

## 2020-10-20 PROCEDURE — 90832 PSYTX W PT 30 MINUTES: CPT | Performed by: PSYCHOLOGIST
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
## 2020-10-20 NOTE — PATIENT INSTRUCTIONS
Goals: 1. Practice being mindful - paying attention to the present moment on purpose and in a nonjudgmental way  2. Practice diaphragmatic breathing throughout the day  3. Practice grounding exercises - noticing what your senses are experiencing in the moment  4. When you feel overwhelmed by emotions, start by naming what you're feeling (e.g., physical sensations, emotions). Then focus on using breathing and movement to shift your nervous system to a calmer place. 5. Learn more about self-compassion at www.self-compassion.org. Watch the video on self-compassion vs self-esteem. 6. When you notice negative self-talk, work on positive self-talk by telling yourself whatever you would tell someone closest to you  7. Try guided meditation using an kannan like Head Space, Calm, or Ten Percent Happier: Meditation for the [employer]  8. Consider learning more about Yamilet Servin's work - MADELINE talks on vulnerability and shame, Netflix special called Call to Nery Contreras, books such as The Gifts of Imperfection, The Gifts of Imperfect Parenting, Daring Greatly, and Rising Strong, and podcast called Unlocking Us.   9. Consider creating a vision board to maintain focus on your goals

## 2020-11-02 ENCOUNTER — VIRTUAL VISIT (OUTPATIENT)
Dept: PSYCHOLOGY | Age: 35
End: 2020-11-02
Payer: COMMERCIAL

## 2020-11-02 PROCEDURE — 90832 PSYTX W PT 30 MINUTES: CPT | Performed by: PSYCHOLOGIST

## 2020-11-30 ENCOUNTER — VIRTUAL VISIT (OUTPATIENT)
Dept: PSYCHOLOGY | Age: 35
End: 2020-11-30
Payer: COMMERCIAL

## 2020-11-30 PROCEDURE — 90832 PSYTX W PT 30 MINUTES: CPT | Performed by: PSYCHOLOGIST

## 2020-11-30 NOTE — PROGRESS NOTES
Behavioral Health Consultation  Yany Ramirez Psy.D. Psychologist  11/30/2020  10:30-10:55 AM      Time spent with Patient: 25 minutes  This is patient's tenth [facility] appointment. Reason for Consult: Depression, anxiety  Referring Provider: Anthony Silva MD  [address] [place] [phone]    TELEHEALTH VISIT -- Audio/Visual (During [id number]-72 public health emergency)    Pursuant to the emergency declaration under the [address] [place] [address] [facility] authority and the [employer] and [employer] Act, this Virtual Visit was conducted, with patient's consent, to reduce the patient's risk of exposure to COVID-19 and provide continuity of care for an established patient. Services were provided through a video synchronous discussion virtually to substitute for in-person clinic visit. Pt gave verbal informed consent to participate in telehealth services. Conducted a risk-benefit analysis and determined that the patient's presenting problems are consistent with the use of telepsychology. Determined that the patient has sufficient knowledge and skills in the use of technology enabling them to adequately benefit from telepsychology. It was determined that this patient was able to be properly treated without an in-person session. Patient verified that they were currently located at the [address] [place] [address] address that was provided during registration.     Verified the following information:  Patient's identification: Yes  Patient location: [address] [place] [address]  Patient's call back number: [phone]  Patient's emergency contact's name and number, as well as permission to contact them if needed:   Extended Emergency Contact Information  Primary Emergency Contact: José Manuel Quan  Address: Jr Reddy [address] [place] [address], [address] Phone: [phone]  Relation: Parent    Provider location: [place]      S:  Pt reported that she's doing well. 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 Will take a couple years to complete. Bosses are supportive, will be flexible w/ pt. Considering whether she wants to eventually get her MA and do school counseling. Dealing with stress related to her son's difficulty with putting his knowledge down on paper for school assignments. Wonders if he has dysgraphia. Will get him evaluated. Working on things w/ her ex. On good terms. He has noticed a big difference in pt, as have others. Pt has lost 25 lbs over the past couple months. Going to the gym consistently. Opening up more to others. Journaling. Letting go of negative thoughts more easily. Learning to tell the difference between a gut feeling and avoidance. O:  Pt's distress has been better managed lately. Appropriate for outpatient / telehealth care at this time.      Interventions:  -Supportive techniques  -Reinforced her efforts towards self-care, which includes working towards her educational goals  -Explored her progress with processing her internal experiences and her efforts to communicate more effectively with others      A:  MSE:  Appearance: good hygiene  and appropriate attire  Attitude: cooperative and friendly  Consciousness: alert  Orientation: oriented to person, place, time, general circumstance  Memory: recent and remote memory intact  Attention/Concentration: intact during session  Psychomotor Activity: normal  Eye Contact: normal  Speech: normal rate and volume, well-articulated  Mood: euthymic  Affect: congruent  Perception: within normal limits  Thought Content: within normal limits  Thought Process: logical, coherent and goal-directed  Insight: good  Judgment: intact  Ability to understand instructions: Yes  Ability to respond meaningfully: Yes  Morbid Ideation: no   Suicide Assessment: no suicidal ideation, plan, or intent  Homicidal Ideation: no      PHQ Scores 5/4/2020 1/11/2019 9/29/2017   PHQ2 Score 0 0 0   PHQ9 Score 0 0 0     Interpretation of Total Score Depression Severity: 1-4 = Minimal depression, 5-9 = Mild depression, 10-14 = Moderate depression, 15-19 = Moderately severe depression, 20-27 = Severe depression    Diagnosis:    1. Major depressive disorder, recurrent episode, in partial remission with anxious distress [facility])        Patient Active Problem List   Diagnosis    Left wrist pain    Reactive airway disease without complication    Severe headache    Neck pain    Bilateral hand numbness    Lymphadenopathy of head and neck    Thrombocytosis (HCC)    Pain of both hip joints    Peripheral tear of medial meniscus of left knee as current injury    Urinary frequency    Depression with anxiety    Anxiety    Memory loss of unknown cause         Plan:  Pt interventions:  Supportive techniques, Emphasized self-care as important for managing overall health and CBT to target balanced thinking. Pt Behavioral Change Plan:  Pt set the following goals:  1. Practice being mindful - paying attention to the present moment on purpose and in a nonjudgmental way  2. Practice diaphragmatic breathing throughout the day  3. Practice grounding exercises - noticing what your senses are experiencing in the moment  4. When you feel overwhelmed by emotions, start by naming what you're feeling (e.g., physical sensations, emotions). Then focus on using breathing and movement to shift your nervous system to a calmer place. 5. Learn more about self-compassion at www.self-compassion.org. Watch the video on self-compassion vs self-esteem. 6. When you notice negative self-talk, work on positive self-talk by telling yourself whatever you would tell someone closest to you  7.  Try guided meditation using an kannan like Head Space, Calm, or Ten Percent Happier: Meditation for the [employer]  8. Consider learning more about Aspen ColladoLexis Gareth's work - MADELINE talks on vulnerability and shame, Netflix special called Call to Nery Contrears, books such as The Gifts of Imperfection, The Gifts of Imperfect Parenting, Daring Greatly, and Rising Strong, and podcast called Unlocking Us. 9. Consider creating a vision board to maintain focus on your goals  10. Consider listening [employer] podcast    Pt scheduled F/U virtual visit in 1 month.

## 2020-11-30 NOTE — PATIENT INSTRUCTIONS
Goals: 1. Practice being mindful - paying attention to the present moment on purpose and in a nonjudgmental way  2. Practice diaphragmatic breathing throughout the day  3. Practice grounding exercises - noticing what your senses are experiencing in the moment  4. When you feel overwhelmed by emotions, start by naming what you're feeling (e.g., physical sensations, emotions). Then focus on using breathing and movement to shift your nervous system to a calmer place. 5. Learn more about self-compassion at www.self-compassion.org. Watch the video on self-compassion vs self-esteem. 6. When you notice negative self-talk, work on positive self-talk by telling yourself whatever you would tell someone closest to you  7. Try guided meditation using an kannan like Head Space, Calm, or Ten Percent Happier: Meditation for the [employer]  8. Consider learning more about Manuel Servin's work - MADELINE talks on vulnerability and shame, Netflix special called Call to Nery Contreras, books such as The Gifts of Imperfection, The Gifts of Imperfect Parenting, Daring Greatly, and Rising Strong, and podcast called Unlocking Us. 9. Consider creating a vision board to maintain focus on your goals  10.  Consider listening [employer] podcast

## 2021-01-05 ENCOUNTER — VIRTUAL VISIT (OUTPATIENT)
Dept: PSYCHOLOGY | Age: 36
End: 2021-01-05
Payer: COMMERCIAL

## 2021-01-05 DIAGNOSIS — F33.41 MAJOR DEPRESSIVE DISORDER, RECURRENT EPISODE, IN PARTIAL REMISSION WITH ANXIOUS DISTRESS (HCC): Primary | ICD-10-CM

## 2021-01-05 PROCEDURE — 90832 PSYTX W PT 30 MINUTES: CPT | Performed by: PSYCHOLOGIST

## 2021-01-05 NOTE — PROGRESS NOTES
Behavioral Health Consultation  Tai Cabral Psy.D. Psychologist  1/5/2021  9-9:30 AM      Time spent with Patient: 25 minutes  This is patient's eleventh [facility] appointment. Reason for Consult: Depression, anxiety  Referring Provider: Sandor Bradshaw MD  [address] [place] [id number]    TELEHEALTH VISIT -- Audio/Visual (During XRQIW-55 public health emergency)    Pursuant to the emergency declaration under the [facility], [facility] waiver authority and the [employer] Act, this Virtual Visit was conducted, with patient's consent, to reduce the patient's risk of exposure to COVID-19 and provide continuity of care for an established patient. Services were provided through a video synchronous discussion virtually to substitute for in-person clinic visit. Pt gave verbal informed consent to participate in telehealth services. Conducted a risk-benefit analysis and determined that the patient's presenting problems are consistent with the use of telepsychology. Determined that the patient has sufficient knowledge and skills in the use of technology enabling them to adequately benefit from telepsychology. It was determined that this patient was able to be properly treated without an in-person session. Patient verified that they were currently located at the [facility] address that was provided during registration.     Verified the following information:  Patient's identification: Yes  Patient location: [address] [place] [address]  Patient's call back number: [phone]  Patient's emergency contact's name and number, as well as permission to contact them if needed:   Extended Emergency Contact Information  Primary Emergency Contact: José Manuel Quan  Address: [address]           [facility], [address] Phone: [phone]  Relation: Parent    Provider location: [place], [place] S:  Pt reported that she's been busy with working and helping her kids w/ their virtual learning this week. Nervous about starting online school next week, but knows she'll adjust quickly. Will take 3 classes to start, with roughly a year left until she earns her degree. No longer willing to talk herself out of returning to school. Pt has been helping her daughter deal with her emotions. Discussed shared parenting. Pt is pleased that she has reunited with her boyfriend. O:  Pt's distress has been well managed lately. Appropriate for outpatient / telehealth care at this time. Interventions:  -Supportive techniques  -Reinforced her efforts to use positive self-talk to push herself forward  -Explored her efforts to help her daughter manage emotions      A:  MSE:  Appearance: good hygiene  and appropriate attire  Attitude: cooperative and friendly  Consciousness: alert  Orientation: oriented to person, place, time, general circumstance  Memory: recent and remote memory intact  Attention/Concentration: intact during session  Psychomotor Activity: normal  Eye Contact: normal  Speech: normal rate and volume, well-articulated  Mood: euthymic  Affect: congruent  Perception: within normal limits  Thought Content: within normal limits  Thought Process: logical, coherent and goal-directed  Insight: good  Judgment: intact  Ability to understand instructions: Yes  Ability to respond meaningfully: Yes  Morbid Ideation: no   Suicide Assessment: no suicidal ideation, plan, or intent  Homicidal Ideation: no      PHQ Scores 5/4/2020 1/11/2019 9/29/2017   PHQ2 Score 0 0 0   PHQ9 Score 0 0 0     Interpretation of Total Score Depression Severity: 1-4 = Minimal depression, 5-9 = Mild depression, 10-14 = Moderate depression, 15-19 = Moderately severe depression, 20-27 = Severe depression    Diagnosis:    1.  Major depressive disorder, recurrent episode, in partial remission with anxious distress ([facility] [address] Patient Active Problem List   Diagnosis    Left wrist pain    Reactive airway disease without complication    Severe headache    Neck pain    Bilateral hand numbness    Lymphadenopathy of head and neck    Thrombocytosis (HCC)    Pain of both hip joints    Peripheral tear of medial meniscus of left knee as current injury    Urinary frequency    Depression with anxiety    Anxiety    Memory loss of unknown cause         Plan:  Pt interventions:  Supportive techniques, Emphasized self-care as important for managing overall health and CBT to target balanced thinking. Pt Behavioral Change Plan:  Pt set the following goals:  1. Practice being mindful - paying attention to the present moment on purpose and in a nonjudgmental way  2. Practice diaphragmatic breathing throughout the day  3. Practice grounding exercises - noticing what your senses are experiencing in the moment  4. When you feel overwhelmed by emotions, start by naming what you're feeling (e.g., physical sensations, emotions). Then focus on using breathing and movement to shift your nervous system to a calmer place. 5. Learn more about self-compassion at www.self-compassion.org. Watch the video on self-compassion vs self-esteem. 6. When you notice negative self-talk, work on positive self-talk by telling yourself whatever you would tell someone closest to you  7. Try guided meditation using an kannan like Head Space, Calm, or Ten Percent Happier: Meditation for the [employer]  8. Consider learning more about Leisa Servin's work - MADELINE talks on vulnerability and shame, Netflix special called Call to [facility], books such as The Gifts of Imperfection, The Gifts of Imperfect Parenting, Daring Greatly, and Rising Strong, and podcast called Unlocking Us. 9. Consider creating a vision board to maintain focus on your goals  10. Consider listening The [employer] podcast    Pt scheduled F/U virtual visit in 1 month.

## 2021-01-05 NOTE — PATIENT INSTRUCTIONS
Goals: 1. Practice being mindful - paying attention to the present moment on purpose and in a nonjudgmental way  2. Practice diaphragmatic breathing throughout the day  3. Practice grounding exercises - noticing what your senses are experiencing in the moment  4. When you feel overwhelmed by emotions, start by naming what you're feeling (e.g., physical sensations, emotions). Then focus on using breathing and movement to shift your nervous system to a calmer place. 5. Learn more about self-compassion at www.self-compassion.org. Watch the video on self-compassion vs self-esteem. 6. When you notice negative self-talk, work on positive self-talk by telling yourself whatever you would tell someone closest to you  7. Try guided meditation using an kannan like Head Space, Calm, or Ten Percent Happier: Meditation for the [employer]  8. Consider learning more about Beck Servin's work - MADELINE talks on vulnerability and shame, Netflix special called Call to Nery Contreras, books such as The Gifts of Imperfection, The Gifts of Imperfect Parenting, Daring Greatly, and Rising Strong, and podcast called Unlocking Us. 9. Consider creating a vision board to maintain focus on your goals  10.  Consider listening The Happiness [employer] podcast

## 2021-01-23 ENCOUNTER — HOSPITAL ENCOUNTER (OUTPATIENT)
Dept: GENERAL RADIOLOGY | Age: 36
Discharge: HOME | End: 2021-01-23
Payer: COMMERCIAL

## 2021-01-23 ENCOUNTER — HOSPITAL ENCOUNTER (OUTPATIENT)
Age: 36
Discharge: HOME | End: 2021-01-23
Payer: COMMERCIAL

## 2021-01-23 DIAGNOSIS — S50.02XA CONTUSION OF LEFT ELBOW, INITIAL ENCOUNTER: ICD-10-CM

## 2021-01-23 PROCEDURE — 73080 X-RAY EXAM OF ELBOW: CPT

## 2021-02-03 ENCOUNTER — VIRTUAL VISIT (OUTPATIENT)
Dept: PSYCHOLOGY | Age: 36
End: 2021-02-03
Payer: COMMERCIAL

## 2021-02-03 DIAGNOSIS — F33.41 MAJOR DEPRESSIVE DISORDER, RECURRENT EPISODE, IN PARTIAL REMISSION WITH ANXIOUS DISTRESS (HCC): Primary | ICD-10-CM

## 2021-02-03 PROCEDURE — 90832 PSYTX W PT 30 MINUTES: CPT | Performed by: PSYCHOLOGIST

## 2021-02-03 NOTE — PROGRESS NOTES
Behavioral Health Consultation  Hector Guadalupe Psy.D. Psychologist  2/3/2021  9:30-10 AM      Time spent with Patient: 30 minutes  This is patient's twelfth [facility] appointment. Reason for Consult: Depression, anxiety  Referring Provider: Tanika Massey MD  [address] [place] [phone]    TELEHEALTH VISIT -- Audio/Visual (During JDVWW-73 public health emergency)    Pursuant to the emergency declaration under the [facility], [facility] waiver authority and the [employer] Act, this Virtual Visit was conducted, with patient's consent, to reduce the patient's risk of exposure to COVID-19 and provide continuity of care for an established patient. Services were provided through a video synchronous discussion virtually to substitute for in-person clinic visit. Pt gave verbal informed consent to participate in telehealth services. Conducted a risk-benefit analysis and determined that the patient's presenting problems are consistent with the use of telepsychology. Determined that the patient has sufficient knowledge and skills in the use of technology enabling them to adequately benefit from telepsychology. It was determined that this patient was able to be properly treated without an in-person session. Patient verified that they were currently located at the [facility] address that was provided during registration.     Verified the following information:  Patient's identification: Yes  Patient location: [address] [place] [address]  Patient's call back number: [phone]  Patient's emergency contact's name and number, as well as permission to contact them if needed:   Extended Emergency Contact Information  Primary Emergency Contact: José Manuel Quan  Address: [address] [place] [address], [facility] [address] Phone: [phone]  Relation: Parent    Provider location: [place], [place] S:  Pt reported that things have been \"interesting. \" Started college classes and is  noticing that she's over-thinking things and tending toward \"perfectionism,\" which is a new issue for her. Working on relaxing her standards and desire to control. Son will be tested for an IEP. Discussed concerns about his development and self-confidence. O:  Interventions:  -Supportive techniques  -Explored her tendency towards perfectionism and discussed ways to relax her efforts to control. Discussed ways to help her hold these experiences more gently. -Processed her concerns about her son's development. Encouraged focus on ways to build his confidence. A:  MSE:  Appearance: good hygiene  and appropriate attire  Attitude: cooperative and friendly  Consciousness: alert  Orientation: oriented to person, place, time, general circumstance  Memory: recent and remote memory intact  Attention/Concentration: intact during session  Psychomotor Activity: normal  Eye Contact: normal  Speech: normal rate and volume, well-articulated  Mood: anxious  Affect: congruent  Perception: within normal limits  Thought Content: within normal limits  Thought Process: logical, coherent and goal-directed  Insight: good  Judgment: intact  Ability to understand instructions: Yes  Ability to respond meaningfully: Yes  Morbid Ideation: no   Suicide Assessment: no suicidal ideation, plan, or intent. Appropriate for outpatient / telehealth care at this time. Homicidal Ideation: no      PHQ Scores 5/4/2020 1/11/2019 9/29/2017   PHQ2 Score 0 0 0   PHQ9 Score 0 0 0     Interpretation of Total Score Depression Severity: 1-4 = Minimal depression, 5-9 = Mild depression, 10-14 = Moderate depression, 15-19 = Moderately severe depression, 20-27 = Severe depression    Diagnosis:    1.  Major depressive disorder, recurrent episode, in partial remission with anxious distress [facility])        Patient Active Problem List   Diagnosis    Left wrist pain  Reactive airway disease without complication    Severe headache    Neck pain    Bilateral hand numbness    Lymphadenopathy of head and neck    Thrombocytosis (HCC)    Pain of both hip joints    Peripheral tear of medial meniscus of left knee as current injury    Urinary frequency    Depression with anxiety    Anxiety    Memory loss of unknown cause         Plan:  Pt interventions:  Supportive techniques, Emphasized self-care as important for managing overall health and Cognitive strategies to target balanced thinking and ways to hold her \"perfectionistic\" control more gently. Pt Behavioral Change Plan:  Pt set the following goals:  1. Practice being mindful - paying attention to the present moment on purpose and in a nonjudgmental way  2. Practice diaphragmatic breathing throughout the day  3. Practice grounding exercises - noticing what your senses are experiencing in the moment  4. When you feel overwhelmed by emotions, start by naming what you're feeling (e.g., physical sensations, emotions). Then focus on using breathing and movement to shift your nervous system to a calmer place. 5. Learn more about self-compassion at www.self-compassion.org. Watch the video on self-compassion vs self-esteem. 6. When you notice negative self-talk, work on positive self-talk by telling yourself whatever you would tell someone closest to you  7. Try guided meditation using an kannan like Head Space, Calm, or Ten Percent Happier: Meditation for the [employer]  8. Consider learning more about Ira Servin's work - MADELINE talks on vulnerability and shame, Netflix special called Call to [facility], books such as The Gifts of Imperfection, The Gifts of Imperfect Parenting, Daring Greatly, and Rising Strong, and podcast called Unlocking Us. 9. Consider creating a vision board to maintain focus on your goals  10.  Consider listening The Happ[employer] podcast 11. Make an effort to hold your urges to make things \"perfect\" in a gentle way    Pt scheduled F/U virtual visit in 1 month.

## 2021-02-03 NOTE — PATIENT INSTRUCTIONS
Goals: 1. Practice being mindful - paying attention to the present moment on purpose and in a nonjudgmental way  2. Practice diaphragmatic breathing throughout the day  3. Practice grounding exercises - noticing what your senses are experiencing in the moment  4. When you feel overwhelmed by emotions, start by naming what you're feeling (e.g., physical sensations, emotions). Then focus on using breathing and movement to shift your nervous system to a calmer place. 5. Learn more about self-compassion at www.self-compassion.org. Watch the video on self-compassion vs self-esteem. 6. When you notice negative self-talk, work on positive self-talk by telling yourself whatever you would tell someone closest to you  7. Try guided meditation using an kannan like Head Space, Calm, or Ten Percent Happier: Meditation for the [employer]  8. Consider learning more about Shiraz Servin's work - MADELINE talks on vulnerability and shame, Netflix special called Call to [employer], books such as The Gifts of Imperfection, The Gifts of Imperfect Parenting, Daring Greatly, and Rising Strong, and podcast called Unlocking Us. 9. Consider creating a vision board to maintain focus on your goals  10.  Consider listening The Happiness Lab podcast

## 2021-03-03 ENCOUNTER — VIRTUAL VISIT (OUTPATIENT)
Dept: PSYCHOLOGY | Age: 36
End: 2021-03-03
Payer: COMMERCIAL

## 2021-03-03 DIAGNOSIS — F33.41 MAJOR DEPRESSIVE DISORDER, RECURRENT EPISODE, IN PARTIAL REMISSION WITH ANXIOUS DISTRESS (HCC): Primary | ICD-10-CM

## 2021-03-03 PROCEDURE — 90832 PSYTX W PT 30 MINUTES: CPT | Performed by: PSYCHOLOGIST

## 2021-03-03 NOTE — PROGRESS NOTES
Behavioral Health Consultation  Fernie Iraheta Psy.D. Psychologist  3/3/2021  9:30-10 AM      Time spent with Patient: 30 minutes  This is patient's thirteenth [facility] appointment. Reason for Consult: Depression, anxiety  Referring Provider: Marcheta Soulier, MD  [address] [place] [phone]    TELEHEALTH VISIT -- Audio/Visual (During HNJPD-06 public health emergency)    Pursuant to the emergency declaration under the [facility], [facility] waiver authority and the [employer] Act, this Virtual Visit was conducted, with patient's consent, to reduce the patient's risk of exposure to COVID-19 and provide continuity of care for an established patient. Services were provided through a video synchronous discussion virtually to substitute for in-person clinic visit. Pt gave verbal informed consent to participate in telehealth services. Conducted a risk-benefit analysis and determined that the patient's presenting problems are consistent with the use of telepsychology. Determined that the patient has sufficient knowledge and skills in the use of technology enabling them to adequately benefit from telepsychology. It was determined that this patient was able to be properly treated without an in-person session. Patient verified that they were currently located at the [facility] address that was provided during registration.     Verified the following information:  Patient's identification: Yes  Patient location: [address] [place] [address]  Patient's call back number: [phone]  Patient's emergency contact's name and number, as well as permission to contact them if needed:   Extended Emergency Contact Information  Primary Emergency Contact: José Manuel Quan  Address: [address] [place] [address], [facility] [address] Phone: [phone]  Relation: Parent    Provider location: [facility] S:  Pt reported that she's been feeling stressed with her son's evaluation and related recommendations. Son is resistant to doing his work and new OT exercises, in large part d/t competition w/ his sisters. Hopeful that getting more tools from speech eval feedback will be helpful. Pt is struggling to fit in time for herself. Noticing herself being too controlling w/ schoolwork to make up for areas in which she feels out of control. Pt and her boyfriend have decided to attend couple's counseling. Working on improving their communication. O:  Interventions:  -Supportive techniques  -Explored recent stressors and pt's tendency to exercise too much control to make up for areas in which she feels out of control  -Processed her concerns about her son's needs  -Reinforced her increasing awareness re: relationship dynamics  -Empowered her to resume regular self-care (e.g., exercise)      A:  MSE:  Appearance: good hygiene  and appropriate attire  Attitude: cooperative and friendly  Consciousness: alert  Orientation: oriented to person, place, time, general circumstance  Memory: recent and remote memory intact  Attention/Concentration: intact during session  Psychomotor Activity: normal  Eye Contact: normal  Speech: normal rate and volume, well-articulated  Mood: anxious  Affect: congruent  Perception: within normal limits  Thought Content: within normal limits  Thought Process: logical, coherent and goal-directed  Insight: good  Judgment: intact  Ability to understand instructions: Yes  Ability to respond meaningfully: Yes  Morbid Ideation: no   Suicide Assessment: no suicidal ideation, plan, or intent. Appropriate for outpatient / telehealth care at this time.   Homicidal Ideation: no      PHQ Scores 5/4/2020 1/11/2019 9/29/2017   PHQ2 Score 0 0 0   PHQ9 Score 0 0 0     Interpretation of Total Score Depression Severity: 1-4 = Minimal depression, 5-9 = Mild depression, 10-14 = Moderate depression, 15-19 = Moderately severe depression, 20-27 = Severe depression    Diagnosis:    1. Major depressive disorder, recurrent episode, in partial remission with anxious distress [facility])        Patient Active Problem List   Diagnosis    Left wrist pain    Reactive airway disease without complication    Severe headache    Neck pain    Bilateral hand numbness    Lymphadenopathy of head and neck    Thrombocytosis (HCC)    Pain of both hip joints    Peripheral tear of medial meniscus of left knee as current injury    Urinary frequency    Depression with anxiety    Anxiety    Memory loss of unknown cause         Plan:  Pt interventions:  Supportive techniques, Emphasized self-care as important for managing overall health and Cognitive strategies to target balanced thinking. Pt Behavioral Change Plan:  Pt set the following goals:  1. Practice being mindful - paying attention to the present moment on purpose and in a nonjudgmental way  2. Practice diaphragmatic breathing throughout the day  3. Practice grounding exercises - noticing what your senses are experiencing in the moment  4. When you feel overwhelmed by emotions, start by naming what you're feeling (e.g., physical sensations, emotions). Then focus on using breathing and movement to shift your nervous system to a calmer place. 5. Learn more about self-compassion at www.self-compassion.org. Watch the video on self-compassion vs self-esteem. 6. When you notice negative self-talk, work on positive self-talk by telling yourself whatever you would tell someone closest to you  7. Try guided meditation using an kannan like Head Space, Calm, or Ten Percent Happier: Meditation for the [employer]  8. Consider learning more about Charles Tatiana Servin's work - MADELINE talks on vulnerability and shame, Netflix special called Call to Nery Contreras, books such as The Gifts of Imperfection, The Gifts of Imperfect Parenting, Daring Greatly, and Rising Strong, and podcast called Unlocking Us. 9. Consider creating a vision board to maintain focus on your goals  10. Consider listening The Happiness Lab podcast  11. Make an effort to hold your urges to make things \"perfect\" in a gentle way    Pt scheduled F/U virtual visit in 1 month.

## 2021-03-03 NOTE — PATIENT INSTRUCTIONS
Goals: 1. Practice being mindful - paying attention to the present moment on purpose and in a nonjudgmental way  2. Practice diaphragmatic breathing throughout the day  3. Practice grounding exercises - noticing what your senses are experiencing in the moment  4. When you feel overwhelmed by emotions, start by naming what you're feeling (e.g., physical sensations, emotions). Then focus on using breathing and movement to shift your nervous system to a calmer place. 5. Learn more about self-compassion at www.self-compassion.org. Watch the video on self-compassion vs self-esteem. 6. When you notice negative self-talk, work on positive self-talk by telling yourself whatever you would tell someone closest to you  7. Try guided meditation using an kannan like Head Space, Calm, or Ten Percent Happier: Meditation for the [employer]  8. Consider learning more about Marshall Servin's work - MADELINE talks on vulnerability and shame, Netflix special called Call to Nery Contreras, books such as The Gifts of Imperfection, The Gifts of Imperfect Parenting, Daring Greatly, and Rising Strong, and podcast called Unlocking Us. 9. Consider creating a vision board to maintain focus on your goals  10. Consider listening The Happiness Lab podcast  11.  Make an effort to hold your urges to make things \"perfect\" in a gentle way

## 2021-04-05 ENCOUNTER — VIRTUAL VISIT (OUTPATIENT)
Dept: PSYCHOLOGY | Age: 36
End: 2021-04-05
Payer: COMMERCIAL

## 2021-04-05 DIAGNOSIS — F33.41 MAJOR DEPRESSIVE DISORDER, RECURRENT EPISODE, IN PARTIAL REMISSION WITH ANXIOUS DISTRESS (HCC): Primary | ICD-10-CM

## 2021-04-05 PROCEDURE — 90832 PSYTX W PT 30 MINUTES: CPT | Performed by: PSYCHOLOGIST

## 2021-04-05 NOTE — PROGRESS NOTES
Behavioral Health Consultation  Angela Howe, Ps[place] Psychologist  4/5/2021  9:30-10 AM      Time spent with Patient: 30 minutes  This is patient's fourteenth [facility] appointment. Reason for Consult: Depression, anxiety  Referring Provider: Maryjane Madrigal MD  [address] [address]    TELEHEALTH VISIT -- Audio/Visual (During [id number]-37 public health emergency)    Pursuant to the emergency declaration under the [address] [place] [address], [facility] waiver authority and the [employer] and [employer] Act, this Virtual Visit was conducted, with patient's consent, to reduce the patient's risk of exposure to COVID-19 and provide continuity of care for an established patient. Services were provided through a video synchronous discussion virtually to substitute for in-person clinic visit. Pt gave verbal informed consent to participate in telehealth services. Conducted a risk-benefit analysis and determined that the patient's presenting problems are consistent with the use of telepsychology. Determined that the patient has sufficient knowledge and skills in the use of technology enabling them to adequately benefit from telepsychology. It was determined that this patient was able to be properly treated without an in-person session. Patient verified that they were currently located at the [facility] address that was provided during registration.     Verified the following information:  Patient's identification: Yes  Patient location: [address] [place] [address]  Patient's call back number: [phone]  Patient's emergency contact's name and number, as well as permission to contact them if needed:   Extended Emergency Contact Information  Primary Emergency Contact: José Manuel Quan  Address: [address]            Winifred Henao, [address] Phone: [phone]  Relation: Parent    Provider location: [place], [place] S:  Pt reported that she and her family enjoyed a trip to [facility]. Pt has relaxed her efforts to over-control her school work. Son was officially put on an IEP. Pt wants to keep him in the same school district because she is pleased with how the school is supporting him. Pt is working on building his self-esteem. Started an 18-week couples therapy program with a PTSD focus for pt and boyfriend. Pt wants to focus on continuing to improve communication. O:  Interventions:  -Supportive techniques  -Processed recent events  -Reinforced her efforts to limit over-controlling behavior and focus on improving communication  -Empowered her to refocus on self-care behavior      A:  MSE:  Appearance: good hygiene  and appropriate attire  Attitude: cooperative and friendly  Consciousness: alert  Orientation: oriented to person, place, time, general circumstance  Memory: recent and remote memory intact  Attention/Concentration: intact during session  Psychomotor Activity: normal  Eye Contact: normal  Speech: normal rate and volume, well-articulated  Mood: euthymic  Affect: congruent, brighter  Perception: within normal limits  Thought Content: within normal limits  Thought Process: logical, coherent and goal-directed  Insight: good  Judgment: intact  Ability to understand instructions: Yes  Ability to respond meaningfully: Yes  Morbid Ideation: no   Suicide Assessment: no suicidal ideation, plan, or intent. Appropriate for outpatient / telehealth care at this time. Homicidal Ideation: no      PHQ Scores 4/4/2021 5/4/2020 1/11/2019 9/29/2017   PHQ2 Score 1 0 0 0   PHQ9 Score 1 0 0 0     Interpretation of Total Score Depression Severity: 1-4 = Minimal depression, 5-9 = Mild depression, 10-14 = Moderate depression, 15-19 = Moderately severe depression, 20-27 = Severe depression    Diagnosis:    1.  Major depressive disorder, recurrent episode, in partial remission with anxious distress [facility])        Patient Active Problem List   Diagnosis    Left wrist pain    Reactive airway disease without complication    Severe headache    Neck pain    Bilateral hand numbness    Lymphadenopathy of head and neck    Thrombocytosis (HCC)    Pain of both hip joints    Peripheral tear of medial meniscus of left knee as current injury    Urinary frequency    Depression with anxiety    Anxiety    Memory loss of unknown cause         Plan:  Pt interventions:  Supportive techniques, Emphasized self-care as important for managing overall health and Cognitive strategies to target balanced thinking. Pt Behavioral Change Plan:  Pt set the following goals:  1. Practice being mindful - paying attention to the present moment on purpose and in a nonjudgmental way  2. Practice diaphragmatic breathing throughout the day  3. Practice grounding exercises - noticing what your senses are experiencing in the moment  4. When you feel overwhelmed by emotions, start by naming what you're feeling (e.g., physical sensations, emotions). Then focus on using breathing and movement to shift your nervous system to a calmer place. 5. Learn more about self-compassion at www.self-compassion.org. Watch the video on self-compassion vs self-esteem. 6. When you notice negative self-talk, work on positive self-talk by telling yourself whatever you would tell someone closest to you  7. Try guided meditation using an kannan like Head Space, Calm, or Ten Percent Happier: Meditation for the [employer]  8. Consider learning more about Jayesh Servin's work - MADELINE talks on vulnerability and shame, Netflix special called Call to [facility], books such as The Gifts of Imperfection, The Gifts of Imperfect Parenting, Daring Greatly, and Rising Strong, and podcast called Unlocking Us. 9. Consider creating a vision board to maintain focus on your goals  10. Consider listening The Happiness Lab podcast  11.  Make an effort to hold your urges to make things \"perfect\" in a gentle way    Pt scheduled F/U virtual visit in 6-8 weeks.

## 2021-04-05 NOTE — PATIENT INSTRUCTIONS
Goals: 1. Practice being mindful - paying attention to the present moment on purpose and in a nonjudgmental way  2. Practice diaphragmatic breathing throughout the day  3. Practice grounding exercises - noticing what your senses are experiencing in the moment  4. When you feel overwhelmed by emotions, start by naming what you're feeling (e.g., physical sensations, emotions). Then focus on using breathing and movement to shift your nervous system to a calmer place. 5. Learn more about self-compassion at www.self-compassion.org. Watch the video on self-compassion vs self-esteem. 6. When you notice negative self-talk, work on positive self-talk by telling yourself whatever you would tell someone closest to you  7. Try guided meditation using an kannan like Head Space, Calm, or Ten Percent Happier: Meditation for the [employer]  8. Consider learning more about Susie Servin's work - MADELINE talks on vulnerability and shame, Netflix special called Call to [employer], books such as The Gifts of Imperfection, The Gifts of Imperfect Parenting, Daring Greatly, and Rising Strong, and podcast called Unlocking Us. 9. Consider creating a vision board to maintain focus on your goals  10. Consider listening The Happiness Lab podcast  11.  Make an effort to hold your urges to make things \"perfect\" in a gentle way

## 2021-04-10 ENCOUNTER — IMMUNIZATION (OUTPATIENT)
Dept: FAMILY MEDICINE CLINIC | Age: 36
End: 2021-04-10
Payer: COMMERCIAL

## 2021-04-10 PROCEDURE — 0001A COVID-19, PFIZER VACCINE 30MCG/0.3ML DOSE: CPT | Performed by: FAMILY MEDICINE

## 2021-04-10 PROCEDURE — 91300 COVID-19, PFIZER VACCINE 30MCG/0.3ML DOSE: CPT | Performed by: FAMILY MEDICINE

## 2021-05-01 ENCOUNTER — IMMUNIZATION (OUTPATIENT)
Dept: FAMILY MEDICINE CLINIC | Age: 36
End: 2021-05-01
Payer: COMMERCIAL

## 2021-05-01 PROCEDURE — 0002A COVID-19, PFIZER VACCINE 30MCG/0.3ML DOSE: CPT | Performed by: FAMILY MEDICINE

## 2021-05-01 PROCEDURE — 91300 COVID-19, PFIZER VACCINE 30MCG/0.3ML DOSE: CPT | Performed by: FAMILY MEDICINE

## 2021-05-24 ENCOUNTER — VIRTUAL VISIT (OUTPATIENT)
Dept: PSYCHOLOGY | Age: 36
End: 2021-05-24
Payer: COMMERCIAL

## 2021-05-24 DIAGNOSIS — F33.41 MAJOR DEPRESSIVE DISORDER, RECURRENT EPISODE, IN PARTIAL REMISSION WITH ANXIOUS DISTRESS (HCC): Primary | ICD-10-CM

## 2021-05-24 PROCEDURE — 90832 PSYTX W PT 30 MINUTES: CPT | Performed by: PSYCHOLOGIST

## 2021-05-24 NOTE — PROGRESS NOTES
Behavioral Health Consultation  Bentley Severs, Psy.D. Psychologist  5/24/2021  11-11:30 AM      Time spent with Patient: 30 minutes  This is patient's fifteenth [facility] appointment. Reason for Consult: Depression, anxiety  Referring Provider: Campos Calvo MD  [address] [id number]    TELEHEALTH VISIT -- Audio/Visual (During HOEAX-04 public health emergency)    Pursuant to the emergency declaration under the [facility], [facility] authority and the [employer] Act, this Virtual Visit was conducted, with patient's consent, to reduce the patient's risk of exposure to COVID-19 and provide continuity of care for an established patient. Services were provided through a video synchronous discussion virtually to substitute for in-person clinic visit. Pt gave verbal informed consent to participate in telehealth services. Conducted a risk-benefit analysis and determined that the patient's presenting problems are consistent with the use of telepsychology. Determined that the patient has sufficient knowledge and skills in the use of technology enabling them to adequately benefit from telepsychology. It was determined that this patient was able to be properly treated without an in-person session. Patient verified that they were currently located at the [facility] address that was provided during registration.     Verified the following information:  Patient's identification: Yes  Patient location: [address] [place] [address]  Patient's call back number: [phone]  Patient's emergency contact's name and number, as well as permission to contact them if needed:   Extended Emergency Contact Information  Primary Emergency Contact: José Manuel Quan  Address: [address]           Gema Marmolejo, [address] Phone: [phone]  Relation: Parent    Provider location: [place]      S:  Pt reported that her house is going on the market later this week. Planning to move into boyfriend's apartment with her kids until they find the right house. [place] that son's issues may be related to his vision, which was a relief. Pt has been busy with accelerated classes. Anxiety remains present but she is better able to manage it now. Pt has lost 10 lbs. Hasn't had time to exercise. Communication within her relationship has been improving through couples-based PTSD treatment program.       O:  Interventions:  -Supportive techniques  -Processed recent events and stressors  -Reinforced her efforts towards self-care      A:  MSE:  Appearance: good hygiene  and appropriate attire  Attitude: cooperative and friendly  Consciousness: alert  Orientation: oriented to person, place, time, general circumstance  Memory: recent and remote memory intact  Attention/Concentration: intact during session  Psychomotor Activity: normal  Eye Contact: normal  Speech: normal rate and volume, well-articulated  Mood: Mildly anxious  Affect: congruent, bright  Perception: within normal limits  Thought Content: within normal limits  Thought Process: logical, coherent and goal-directed  Insight: good  Judgment: intact  Ability to understand instructions: Yes  Ability to respond meaningfully: Yes  Morbid Ideation: no   Suicide Assessment: no suicidal ideation, plan, or intent. Appropriate for outpatient / telehealth care at this time. Homicidal Ideation: no      PHQ Scores 4/4/2021 5/4/2020 1/11/2019 9/29/2017   PHQ2 Score 1 0 0 0   PHQ9 Score 1 0 0 0     Interpretation of Total Score Depression Severity: 1-4 = Minimal depression, 5-9 = Mild depression, 10-14 = Moderate depression, 15-19 = Moderately severe depression, 20-27 = Severe depression    Diagnosis:    1.  Major depressive disorder, recurrent episode, in partial remission with anxious distress [facility])        Patient Active Problem List   Diagnosis    Left wrist pain weeks.

## 2021-07-20 ENCOUNTER — VIRTUAL VISIT (OUTPATIENT)
Dept: PSYCHOLOGY | Age: 36
End: 2021-07-20
Payer: COMMERCIAL

## 2021-07-20 DIAGNOSIS — F33.41 MAJOR DEPRESSIVE DISORDER, RECURRENT EPISODE, IN PARTIAL REMISSION WITH ANXIOUS DISTRESS (HCC): Primary | ICD-10-CM

## 2021-07-20 PROCEDURE — 90832 PSYTX W PT 30 MINUTES: CPT | Performed by: PSYCHOLOGIST

## 2021-07-20 NOTE — PROGRESS NOTES
Behavioral Health Consultation  Thuan Hunt Psy.D. Psychologist  7/20/2021  12:35-12:55 PM      Time spent with Patient: 20 minutes  This is patient's sixteenth [facility] appointment. Reason for Consult: Depression, anxiety  Referring Provider: Kapil Martino MD  [address] [address]    TELEHEALTH VISIT -- Audio/Visual (During QPVJR-06 public health emergency)    Pursuant to the emergency declaration under the [facility], [facility] waiver authority and the [employer] Act, this Virtual Visit was conducted, with patient's consent, to reduce the patient's risk of exposure to COVID-19 and provide continuity of care for an established patient. Services were provided through a video synchronous discussion virtually to substitute for in-person clinic visit. Pt gave verbal informed consent to participate in telehealth services. Conducted a risk-benefit analysis and determined that the patient's presenting problems are consistent with the use of telepsychology. Determined that the patient has sufficient knowledge and skills in the use of technology enabling them to adequately benefit from telepsychology. It was determined that this patient was able to be properly treated without an in-person session. Patient verified that they were currently located at the [facility] address that was provided during registration.     Verified the following information:  Patient's identification: Yes  Patient location: [place] [address]  Patient's call back number: [phone]  Patient's emergency contact's name and number, as well as permission to contact them if needed:   Extended Emergency Contact Information  Primary Emergency Contact: Fritsch,Rob  Address: [address] Phone: [phone]  Relation: Domestic Partner  Secondary Emergency Contact: José Manuel Quan  [facility] [address] [place] [address] Phone: [phone]  Relation: Parent    Provider location: [place]:  Pt shared about recent experiences and stressors. Pt got engaged unexpectedly. Pt and her fiance found a new home near pt's close friend and will move in a few weeks. Dealt with stress w/ new owners of her former home after the basement leaked following the closing. Sleep was poor while stress was high. Talking openly has been helpful. Feeling better now. Looking forward to resuming regular exercise after she moves. O:  Interventions:  -Supportive techniques  -Processed recent events and stressors  -Reinforced her efforts towards self-care  -Highlighted areas of progress      A:  MSE:  Appearance: good hygiene  and appropriate attire  Attitude: cooperative and friendly  Consciousness: alert  Orientation: oriented to person, place, time, general circumstance  Memory: recent and remote memory intact  Attention/Concentration: intact during session  Psychomotor Activity: normal  Eye Contact: normal  Speech: normal rate and volume, well-articulated  Mood: mildly anxious  Affect: congruent, bright  Perception: within normal limits  Thought Content: within normal limits  Thought Process: logical, coherent and goal-directed  Insight: good  Judgment: intact  Ability to understand instructions: Yes  Ability to respond meaningfully: Yes  Morbid Ideation: no   Suicide Assessment: no suicidal ideation, plan, or intent. Appropriate for outpatient / telehealth care at this time.   Homicidal Ideation: no      PHQ Scores 4/4/2021 5/4/2020 1/11/2019 9/29/2017   PHQ2 Score 1 0 0 0   PHQ9 Score 1 0 0 0     Interpretation of Total Score Depression Severity: 1-4 = Minimal depression, 5-9 = Mild depression, 10-14 = Moderate depression, 15-19 = Moderately severe depression, 20-27 = Severe depression    Diagnosis:    1. Major depressive disorder, recurrent episode, in partial remission with anxious distress [facility])        Patient Active Problem List   Diagnosis    Left wrist pain    Reactive airway disease without complication    Severe headache    Neck pain    Bilateral hand numbness    Lymphadenopathy of head and neck    Thrombocytosis (HCC)    Pain of both hip joints    Peripheral tear of medial meniscus of left knee as current injury    Urinary frequency    Depression with anxiety    Anxiety    Memory loss of unknown cause         Plan:  Pt interventions:  Supportive techniques, Emphasized self-care as important for managing overall health and Cognitive strategies to target balanced thinking. Pt Behavioral Change Plan:  Pt set the following goals:  1. Practice being mindful - paying attention to the present moment on purpose and in a nonjudgmental way  2. Practice diaphragmatic breathing throughout the day  3. Practice grounding exercises - noticing what your senses are experiencing in the moment  4. When you feel overwhelmed by emotions, start by naming what you're feeling (e.g., physical sensations, emotions). Then focus on using breathing and movement to shift your nervous system to a calmer place. 5. Learn more about self-compassion at www.self-compassion.org. Watch the video on self-compassion vs self-esteem. 6. When you notice negative self-talk, work on positive self-talk by telling yourself whatever you would tell someone closest to you  7. Try guided meditation using an kannan like Head Space, Calm, or Ten Percent Happier: Meditation for the [employer]  8. Consider learning more about Lorena Servin's work - MADELINE talks on vulnerability and shame, Netflix special called Call to [facility], books such as The Gifts of Imperfection, The Gifts of Imperfect Parenting, Daring Greatly, and Rising Strong, and podcast called Unlocking Us.   9. Consider creating a vision board to maintain focus on your goals  10. Consider listening The Happiness Lab podcast  11. Make an effort to hold your urges to make things \"perfect\" in a gentle way    Pt scheduled a F/U virtual visit.

## 2021-07-20 NOTE — PATIENT INSTRUCTIONS
Goals: 1. Practice being mindful - paying attention to the present moment on purpose and in a nonjudgmental way  2. Practice diaphragmatic breathing throughout the day  3. Practice grounding exercises - noticing what your senses are experiencing in the moment  4. When you feel overwhelmed by emotions, start by naming what you're feeling (e.g., physical sensations, emotions). Then focus on using breathing and movement to shift your nervous system to a calmer place. 5. Learn more about self-compassion at www.self-compassion.org. Watch the video on self-compassion vs self-esteem. 6. When you notice negative self-talk, work on positive self-talk by telling yourself whatever you would tell someone closest to you  7. Try guided meditation using an kannan like Head Space, Calm, or Ten Percent Happier: Meditation for the [employer]  8. Consider learning more about Ernesto Servin's work - MADELINE talks on vulnerability and shame, Netflix special called Call to [employer], books such as The Gifts of Imperfection, The Gifts of Imperfect Parenting, Daring Greatly, and Rising Strong, and podcast called Unlocking Us. 9. Consider creating a vision board to maintain focus on your goals  10. Consider listening The Happiness Lab podcast  11.  Make an effort to hold your urges to make things \"perfect\" in a gentle way

## 2021-09-09 ENCOUNTER — VIRTUAL VISIT (OUTPATIENT)
Dept: PSYCHOLOGY | Age: 36
End: 2021-09-09
Payer: COMMERCIAL

## 2021-09-09 DIAGNOSIS — F33.41 MAJOR DEPRESSIVE DISORDER, RECURRENT EPISODE, IN PARTIAL REMISSION WITH ANXIOUS DISTRESS (HCC): Primary | ICD-10-CM

## 2021-09-09 PROCEDURE — 90832 PSYTX W PT 30 MINUTES: CPT | Performed by: PSYCHOLOGIST

## 2021-09-09 NOTE — PROGRESS NOTES
Behavioral Health Consultation  Don Graves Psy.D. Psychologist  9/9/2021  12-12:30 PM      Time spent with Patient: 30 minutes  This is patient's seventeenth [facility] appointment. Reason for Consult: Depression, anxiety  Referring Provider: Dandre Roberts MD  [address] [place] [id number]    TELEHEALTH VISIT -- Audio/Visual (During PWAWX-07 public health emergency)    Pursuant to the emergency declaration under the [facility], [facility] authority and the [employer] Act, this Virtual Visit was conducted, with patient's consent, to reduce the patient's risk of exposure to COVID-19 and provide continuity of care for an established patient. Services were provided through a video synchronous discussion virtually to substitute for in-person clinic visit. Pt gave verbal informed consent to participate in telehealth services. Conducted a risk-benefit analysis and determined that the patient's presenting problems are consistent with the use of telepsychology. Determined that the patient has sufficient knowledge and skills in the use of technology enabling them to adequately benefit from telepsychology. It was determined that this patient was able to be properly treated without an in-person session. Patient verified that they were currently located at the [facility] address that was provided during registration.     Verified the following information:  Patient's identification: Yes  Patient location: [address] [place], [place] [address]Randa Pineda [id number]  Patient's call back number: [phone]  Patient's emergency contact's name and number, as well as permission to contact them if needed:   Extended Emergency Contact Information  Primary Emergency Contact: Fritsch,Rob  Address: [address] Phone: [phone]  Relation: Domestic Partner  Secondary Emergency Contact: José Manuel Quan  Address: [facility] [place] [address] Phone: [phone]  Relation: Parent    Provider location: Rosy Frias[address]:  Pt shared about her family's process of settling in to their new home and school. Pt would like to finish unpacking so she can feel more settled. Doing her best to balance work, classes, and parenting. Going to the gym regularly. Reflected on family dynamics and parenting concerns. O:  Interventions:  -Supportive techniques  -Processed recent events and stressors  -Reinforced her efforts towards self-care  -Highlighted areas of progress      A:  MSE:  Appearance: good hygiene  and appropriate attire  Attitude: cooperative and friendly  Consciousness: alert  Orientation: oriented to person, place, time, general circumstance  Memory: recent and remote memory intact  Attention/Concentration: intact during session  Psychomotor Activity: normal  Eye Contact: normal  Speech: normal rate and volume, well-articulated  Mood: mildly anxious  Affect: congruent, bright  Perception: within normal limits  Thought Content: within normal limits  Thought Process: logical, coherent and goal-directed  Insight: good  Judgment: intact  Ability to understand instructions: Yes  Ability to respond meaningfully: Yes  Morbid Ideation: no   Suicide Assessment: no suicidal ideation, plan, or intent. Appropriate for outpatient / telehealth care at this time. Homicidal Ideation: no      PHQ Scores 4/4/2021 5/4/2020 1/11/2019 9/29/2017   PHQ2 Score 1 0 0 0   PHQ9 Score 1 0 0 0     Interpretation of Total Score Depression Severity: 1-4 = Minimal depression, 5-9 = Mild depression, 10-14 = Moderate depression, 15-19 = Moderately severe depression, 20-27 = Severe depression    Diagnosis:    1.  Major depressive disorder, recurrent episode, in partial remission with anxious distress [facility])        Patient Active Problem List   Diagnosis    Left wrist pain    Reactive airway disease without complication    Severe headache    Neck pain    Bilateral hand numbness    Lymphadenopathy of head and neck    Thrombocytosis (HCC)    Pain of both hip joints    Peripheral tear of medial meniscus of left knee as current injury    Urinary frequency    Depression with anxiety    Anxiety    Memory loss of unknown cause         Plan:  Pt interventions:  Supportive techniques, Emphasized self-care as important for managing overall health and Cognitive strategies to target balanced thinking. Pt Behavioral Change Plan:  Pt set the following goals:  1. Practice being mindful - paying attention to the present moment on purpose and in a nonjudgmental way  2. Practice diaphragmatic breathing throughout the day  3. Practice grounding exercises - noticing what your senses are experiencing in the moment  4. When you feel overwhelmed by emotions, start by naming what you're feeling (e.g., physical sensations, emotions). Then focus on using breathing and movement to shift your nervous system to a calmer place. 5. Learn more about self-compassion at www.self-compassion.org. Watch the video on self-compassion vs self-esteem. 6. When you notice negative self-talk, work on positive self-talk by telling yourself whatever you would tell someone closest to you  7. Try guided meditation using an kannan like Head Space, Calm, or Ten Percent Happier: Meditation for the [employer]  8. Consider learning more about Huma Servin's work - MADELINE talks on vulnerability and shame, Netflix special called Call to [facility], books such as The Gifts of Imperfection, The Gifts of Imperfect Parenting, Daring Greatly, and Rising Strong, and podcast called Unlocking Us. 9. Consider creating a vision board to maintain focus on your goals  10. Consider listening The [employer] podcast  11.  Make an effort to hold your urges to make things \"perfect\" in a gentle way    Pt scheduled a F/U virtual visit.

## 2021-09-09 NOTE — PATIENT INSTRUCTIONS
Goals: 1. Practice being mindful - paying attention to the present moment on purpose and in a nonjudgmental way  2. Practice diaphragmatic breathing throughout the day  3. Practice grounding exercises - noticing what your senses are experiencing in the moment  4. When you feel overwhelmed by emotions, start by naming what you're feeling (e.g., physical sensations, emotions). Then focus on using breathing and movement to shift your nervous system to a calmer place. 5. Learn more about self-compassion at www.self-compassion.org. Watch the video on self-compassion vs self-esteem. 6. When you notice negative self-talk, work on positive self-talk by telling yourself whatever you would tell someone closest to you  7. Try guided meditation using an kannan like Head Space, Calm, or Ten Percent Happier: Meditation for the [employer]  8. Consider learning more about Jessica Servin's work - MADELINE talks on vulnerability and shame, Netflix special called Call to Nery Contreras, books such as The Gifts of Imperfection, The Gifts of Imperfect Parenting, Daring Greatly, and Rising Strong, and podcast called Unlocking Us. 9. Consider creating a vision board to maintain focus on your goals  10. Consider listening The [employer] podcast  11.  Make an effort to hold your urges to make things \"perfect\" in a gentle way

## 2021-10-15 ENCOUNTER — NURSE ONLY (OUTPATIENT)
Dept: FAMILY MEDICINE CLINIC | Age: 36
End: 2021-10-15
Payer: COMMERCIAL

## 2021-10-15 DIAGNOSIS — Z23 NEED FOR INFLUENZA VACCINATION: Primary | ICD-10-CM

## 2021-10-15 PROCEDURE — 90471 IMMUNIZATION ADMIN: CPT | Performed by: FAMILY MEDICINE

## 2021-10-15 PROCEDURE — 90674 CCIIV4 VAC NO PRSV 0.5 ML IM: CPT | Performed by: FAMILY MEDICINE

## 2021-10-20 ENCOUNTER — VIRTUAL VISIT (OUTPATIENT)
Dept: PSYCHOLOGY | Age: 36
End: 2021-10-20
Payer: COMMERCIAL

## 2021-10-20 DIAGNOSIS — F33.42 RECURRENT MAJOR DEPRESSIVE DISORDER, IN FULL REMISSION (HCC): Primary | ICD-10-CM

## 2021-10-20 PROCEDURE — 90832 PSYTX W PT 30 MINUTES: CPT | Performed by: PSYCHOLOGIST

## 2021-10-20 NOTE — PROGRESS NOTES
Behavioral Health Consultation  Patt Bauer Psy.D. Psychologist  10/20/2021  1:30-2 PM      Time spent with Patient: 30 minutes  This is patient's eighteenth [facility] appointment. Reason for Consult: Depression, anxiety  Referring Provider: Kelvin Oliva MD  [address] [place] [address]    TELEHEALTH VISIT -- Audio/Visual (During IOKOM-55 public health emergency)    Pursuant to the emergency declaration under the [facility], [facility] authority and the [employer] Act, this Virtual Visit was conducted, with patient's consent, to reduce the patient's risk of exposure to COVID-19 and provide continuity of care for an established patient. Services were provided through a video synchronous discussion virtually to substitute for in-person clinic visit. Pt gave verbal informed consent to participate in telehealth services. Conducted a risk-benefit analysis and determined that the patient's presenting problems are consistent with the use of telepsychology. Determined that the patient has sufficient knowledge and skills in the use of technology enabling them to adequately benefit from telepsychology. It was determined that this patient was able to be properly treated without an in-person session. Patient verified that they were currently located at the [facility] address that was provided during registration.     Verified the following information:  Patient's identification: Yes  Patient location: [address] [place] [address]  Ezio Pendergrass [address]  Patient's call back number: [phone]  Patient's emergency contact's name and number, as well as permission to contact them if needed:   Extended Emergency Contact Information  Primary Emergency Contact: Fritsch,Rob  Address: [address] Phone: [phone]  Relation: Domestic Partner  Secondary Emergency Contact: José Manuel Quan  Address: [place] [address] Phone: [phone]  Relation: Parent    Provider location: [facility] [place]:  Pt has been feeling more scatter-brained lately. Difficult to keep up with her workload. Higher stress since his teacher brought up the possibility that her son is showing symptoms of ADHD. Pt is starting to wonder whether she has undiagnosed ADHD as well. Daughter Soraida Dean will be seeing the counselor d/t thoughts about not wanting to be here (no plan or intent). O:  Interventions:  -Supportive techniques  -Processed recent events and stressors  -Reinforced her efforts towards self-care  -Discussed legacy burdens, cognitive reappraisal, and behavioral patterns w/in her family  -Explored strategies to improve executive functioning      A:  MSE:  Appearance: good hygiene  and appropriate attire  Attitude: cooperative and friendly  Consciousness: alert  Orientation: oriented to person, place, time, general circumstance  Memory: recent and remote memory intact  Attention/Concentration: intact during session  Psychomotor Activity: normal  Eye Contact: normal  Speech: normal rate and volume, well-articulated  Mood: mildly anxious  Affect: congruent  Perception: within normal limits  Thought Content: within normal limits  Thought Process: logical, coherent and goal-directed  Insight: good  Judgment: intact  Ability to understand instructions: Yes  Ability to respond meaningfully: Yes  Morbid Ideation: no   Suicide Assessment: no suicidal ideation, plan, or intent. Appropriate for outpatient / telehealth care at this time.   Homicidal Ideation: no      PHQ Scores 4/4/2021 5/4/2020 1/11/2019 9/29/2017   PHQ2 Score 1 0 0 0   PHQ9 Score 1 0 0 0     Interpretation of Total Score Depression Severity: 1-4 = Minimal depression, 5-9 = Mild depression, 10-14 = Moderate depression, 15-19 = Moderately severe depression, 20-27 = Severe depression    Diagnosis:    1. Recurrent major depressive disorder, in full remission [facility])        Patient Active Problem List   Diagnosis    Left wrist pain    Reactive airway disease without complication    Severe headache    Neck pain    Bilateral hand numbness    Lymphadenopathy of head and neck    Thrombocytosis    Pain of both hip joints    Peripheral tear of medial meniscus of left knee as current injury    Urinary frequency    Depression with anxiety    Anxiety    Memory loss of unknown cause         Plan:  Pt interventions:  Supportive techniques, Emphasized self-care as important for managing overall health and Cognitive strategies to target balanced thinking. Pt Behavioral Change Plan:  Pt set the following goals:  1. Practice being mindful - paying attention to the present moment on purpose and in a nonjudgmental way  2. Practice diaphragmatic breathing throughout the day  3. Practice grounding exercises - noticing what your senses are experiencing in the moment  4. When you feel overwhelmed by emotions, start by naming what you're feeling (e.g., physical sensations, emotions). Then focus on using breathing and movement to shift your nervous system to a calmer place. 5. Learn more about self-compassion at www.self-compassion.org. Watch the video on self-compassion vs self-esteem. 6. When you notice negative self-talk, work on positive self-talk by telling yourself whatever you would tell someone closest to you  7. Try guided meditation using an kannan like Head Space, Calm, or Ten Percent Happier: Meditation for the [employer]  8. Consider learning more about Inés Servin's work - MADELINE talks on vulnerability and shame, Netflix special called Call to [facility], books such as The Gifts of Imperfection, The Gifts of Imperfect Parenting, Daring Greatly, and Rising Strong, and podcast called Unlocking Us. 9. Consider creating a vision board to maintain focus on your goals  10.  Consider listening The [employer] podcast  11. Make an effort to hold your urges to make things \"perfect\" in a gentle way    Pt scheduled a F/U virtual visit.

## 2021-10-20 NOTE — PATIENT INSTRUCTIONS
Goals: 1. Practice being mindful - paying attention to the present moment on purpose and in a nonjudgmental way  2. Practice diaphragmatic breathing throughout the day  3. Practice grounding exercises - noticing what your senses are experiencing in the moment  4. When you feel overwhelmed by emotions, start by naming what you're feeling (e.g., physical sensations, emotions). Then focus on using breathing and movement to shift your nervous system to a calmer place. 5. Learn more about self-compassion at www.self-compassion.org. Watch the video on self-compassion vs self-esteem. 6. When you notice negative self-talk, work on positive self-talk by telling yourself whatever you would tell someone closest to you  7. Try guided meditation using an kannan like Head Space, Calm, or Ten Percent Happier: Meditation for the [employer]  8. Consider learning more about Steve Servin's work - MADELINE talks on vulnerability and shame, Netflix special called Call to [employer] Contreras, books such as The Gifts of Imperfection, The Gifts of Imperfect Parenting, Daring Greatly, and Rising Strong, and podcast called Unlocking Us. 9. Consider creating a vision board to maintain focus on your goals  10. Consider listening The [employer] podcast  11.  Make an effort to hold your urges to make things \"perfect\" in a gentle way

## 2021-11-18 ENCOUNTER — VIRTUAL VISIT (OUTPATIENT)
Dept: PSYCHOLOGY | Age: 36
End: 2021-11-18
Payer: COMMERCIAL

## 2021-11-18 DIAGNOSIS — F90.2 ADHD (ATTENTION DEFICIT HYPERACTIVITY DISORDER), COMBINED TYPE: Primary | ICD-10-CM

## 2021-11-18 DIAGNOSIS — F33.42 RECURRENT MAJOR DEPRESSIVE DISORDER, IN FULL REMISSION (HCC): ICD-10-CM

## 2021-11-18 PROCEDURE — 90832 PSYTX W PT 30 MINUTES: CPT | Performed by: PSYCHOLOGIST

## 2021-11-18 NOTE — Clinical Note
Sebastián Hercules,  I screening pt for ADHD and do think she meets criteria. She's going to follow up with you to discuss medication options.   Thanks,  Tammie Fletcher

## 2021-11-18 NOTE — PROGRESS NOTES
Behavioral Health Consultation  Tyrese Roberson Psy.D. Psychologist  11/18/2021  1:30-2 PM      Time spent with Patient: 30 minutes  This is patient's nineteenth [facility] appointment. Reason for Consult: Depression, anxiety  Referring Provider: Anjali Toro MD  [address] [place] [id number]    TELEHEALTH VISIT -- Audio/Visual (During AES-66 public health emergency)    Pursuant to the emergency declaration under the [address] [place] [address], [facility] waiver authority and the [employer] Act, this Virtual Visit was conducted, with patient's consent, to reduce the patient's risk of exposure to COVID-19 and provide continuity of care for an established patient. Services were provided through a video synchronous discussion virtually to substitute for in-person clinic visit. Pt gave verbal informed consent to participate in telehealth services. Conducted a risk-benefit analysis and determined that the patient's presenting problems are consistent with the use of telepsychology. Determined that the patient has sufficient knowledge and skills in the use of technology enabling them to adequately benefit from telepsychology. It was determined that this patient was able to be properly treated without an in-person session. Patient verified that they were currently located at the [facility] address that was provided during registration.     Verified the following information:  Patient's identification: Yes  Patient location: [address] [place] [address]  [facility] [id number]  Patient's call back number: [phone]  Patient's emergency contact's name and number, as well as permission to contact them if needed:   Extended Emergency Contact Information  Primary Emergency Contact: Fritsch,Rob  Address: [address] Phone: [phone]  Relation: Domestic Partner  Secondary Emergency Contact: José Manuel Quan  Address: [address] [place] [address] Phone: [phone]  Relation: Parent    Provider location: [place], [address]:  Pt reflected on her son's ADHD diagnosis. Pt is realizing more about her own potential ADHD symptoms - oral fixation, constantly fidgeting, thoughts all over the place, restless sleep. Always taking on more tasks but never feels like she's keeping up. Gets tasks done \"just enough\" but not fully. Wonders whether ADHD might better explain some of her struggles than her depression and anxiety diagnoses have. DSM 5 Diagnostic Interview  ADHD    A. Inattention inclusion: Requires a pattern of behavior, with onset before age 15 years, that is present in multiple settings and gives rise to social, educational, or work performance difficulties. The symptoms must be persistently present for at least 6 months to a degree inconsistent with developmental level. The disorder is manifested by at least six of the following symptoms of inattention. 1. Overlooks details: Over at least the last 6 months, have other people told you that you often overlook or miss details, or that you made careless mistakes in your work? Yes  2. Task inattention: Do you often have difficulty staying focused on a task or activity, such as reading a lengthy writing or listening to a lecture or conversation? Yes, 100%  3. Appears not to listen: Do other people tell you that when they speak to you, your mind often seems to be elsewhere or that it seems like you are not listening? Yes  4. Fails to finish tasks: Do you often struggle to finish schoolwork, chores, or work assignments because you lose focus or are easily sidetracked? Yes  5. Difficulty organizing tasks: Do you find it difficult to organize tasks or activities? Do you struggle with time management or fail to meet deadlines? Yes  6.  Avoids tasks requiring sustained mental activity: Do you often avoid tasks that require sustained mental effort? No  7. Often loses things necessary for tasks: Do you often lose things that are essential for tasks or activities, such as school materials, books, tools, wallets, keys, paperwork, eyeglasses, or your phone? Yes  8. Easily distracted: Do you find that you are often easily distracted by things or thoughts unrelated to the activity or task your are supposed to be doing? Yes  9. Often forgetful: Do you find, or do other people find, that you are often forgetful in your daily activities? Yes    B. Hyperactivity/impulsivity inclusion: Alternatively, requires the presence of at least six of the following manifestations of hyperactivity and impulsivity over the same course. 1. Fidgets: Over the last 6 months, have you often found yourself fidgeting with your hands or feet? Do you find it hard to sit without squirming? Yes  2. Leaves seat: When you are in a situation where you are expected to sit, do you often leave your seat? No  3. Runs or climbs: Do you often find yourself running around or climbing in a situation where doing so is inappropriate, or feeling restless? Yes, restless  4. Unable to maintain quiet: Do you often find yourself unable to play or engage in leisure activities quietly? No  5. Hyperactivity: Do you often feel as if you are, or do other people describe you as always being, on the go or acting as if you were driven by a motor ? Do you find it uncomfortable to sit still for an extended time? Yes, hard to sit still  6. Talks excessively: Do you often talk excessively? Yes  7. Blurts answers: Do often struggle to wait your turn in a conversation? Do you often complete other peoples sentences or blurt out an answer before a question has been completed? Sometimes  8. Struggles to take turns: Do you often have difficulty waiting your turn or waiting in line? No  9.  Interrupts or intrudes: Do you often butt into other peoples activities, conversations, or games? Do you often start using other peoples things without permission? No        Current functioning:  - Household tasks (laundry, [employer], bills) - Long to-do list that never fully gets done. Misplaces bills. - Starting projects without finishing them - Yes  - Sleep patterns - Up and down 3-5 times per night  - Run late - No, but pushes to the last minute  - Hx of traffic tickets, especially speeding tickets - No  - Substances, especially caffeine, nicotine and THC - Caffeine    Childhood functioning:  - School hx - had to work hard to get grades above Cs d/t difficulty concentrating and putting in the work. No IEP. Did reading tutoring in 3rd grade. Late assignments in , last minute. - Needed help from parents to keep up with assignments  - School performance better in subjects patient liked   - Family hx - Son was recently diagnosed with ADHD      O:  Interventions:  -Supportive techniques  -Processed recent events and stressors  -Conducted screening for ADHD and administered ASRS. Provided feedback about likely diagnosis. Discussed relationship between ADHD and symptoms of depression/anxiety. A:  MSE:  Appearance: good hygiene  and appropriate attire  Attitude: cooperative and friendly  Consciousness: alert  Orientation: oriented to person, place, time, general circumstance  Memory: recent and remote memory intact  Attention/Concentration: intact during session  Psychomotor Activity: normal  Eye Contact: normal  Speech: normal rate and volume, well-articulated  Mood: mildly anxious  Affect: congruent  Perception: within normal limits  Thought Content: within normal limits  Thought Process: logical, coherent and goal-directed  Insight: good  Judgment: intact  Ability to understand instructions: Yes  Ability to respond meaningfully: Yes  Morbid Ideation: no   Suicide Assessment: no suicidal ideation, plan, or intent.  Appropriate for outpatient / telehealth care at this time. Homicidal Ideation: no    Pt completed an ADHD screener, results were positive. See flowsheet. Score for Inattentive 26. [ ] 0-16 Unlikely  [ ]17-23 Likely  [X]24+ Highly likely  Score for Hyperactive/Impulsive 23. [ ] 0-16 Unlikely  [X]17-23 Likely  [ ]24+ Highly likely      PHQ Scores 4/4/2021 5/4/2020 1/11/2019 9/29/2017   PHQ2 Score 1 0 0 0   PHQ9 Score 1 0 0 0     Interpretation of Total Score Depression Severity: 1-4 = Minimal depression, 5-9 = Mild depression, 10-14 = Moderate depression, 15-19 = Moderately severe depression, 20-27 = Severe depression    Diagnosis:    1. ADHD (attention deficit hyperactivity disorder), combined type    2. Recurrent major depressive disorder, in full remission [facility])        Patient Active Problem List   Diagnosis    Left wrist pain    Reactive airway disease without complication    Severe headache    Neck pain    Bilateral hand numbness    Lymphadenopathy of head and neck    Thrombocytosis    Pain of both hip joints    Peripheral tear of medial meniscus of left knee as current injury    Urinary frequency    Depression with anxiety    Anxiety    Memory loss of unknown cause         Plan:  Pt interventions:  Supportive techniques, Emphasized self-care as important for managing overall health, Cognitive strategies to target balanced thinking and Conducted evaluation for ADHD and administered ASRS. Pt Behavioral Change Plan:  Pt set the following goals:  1. Practice being mindful - paying attention to the present moment on purpose and in a nonjudgmental way  2. Practice diaphragmatic breathing throughout the day  3. Practice grounding exercises - noticing what your senses are experiencing in the moment  4. When you feel overwhelmed by emotions, start by naming what you're feeling (e.g., physical sensations, emotions). Then focus on using breathing and movement to shift your nervous system to a calmer place.   5. Learn more about self-compassion at www.self-compassion.org. Watch the video on self-compassion vs self-esteem. 6. When you notice negative self-talk, work on positive self-talk by telling yourself whatever you would tell someone closest to you  7. Try guided meditation using an kannan like Head Space, Calm, or Ten Percent Happier: Meditation for the [employer]  8. Consider learning more about Yoni TaiLexis Gareth's work - MADELINE talks on vulnerability and shame, Netflix special called Call to Nery Contreras, books such as The Gifts of Imperfection, The Gifts of Imperfect Parenting, Daring Greatly, and Rising Strong, and podcast called Unlocking Us. 9. Consider creating a vision board to maintain focus on your goals  10. Consider listening The Happiness Lab podcast  11. Make an effort to hold your urges to make things \"perfect\" in a gentle way  12. Look into work by Dr. Heydi Alaniz about ADHD    Pt scheduled a F/U virtual visit.

## 2021-11-18 NOTE — PATIENT INSTRUCTIONS
Goals: 1. Practice being mindful - paying attention to the present moment on purpose and in a nonjudgmental way  2. Practice diaphragmatic breathing throughout the day  3. Practice grounding exercises - noticing what your senses are experiencing in the moment  4. When you feel overwhelmed by emotions, start by naming what you're feeling (e.g., physical sensations, emotions). Then focus on using breathing and movement to shift your nervous system to a calmer place. 5. Learn more about self-compassion at www.self-compassion.org. Watch the video on self-compassion vs self-esteem. 6. When you notice negative self-talk, work on positive self-talk by telling yourself whatever you would tell someone closest to you  7. Try guided meditation using an kannan like Head Space, Calm, or Ten Percent Happier: Meditation for the [employer]  8. Consider learning more about Inés Linda Gareth's work - MADELINE talks on vulnerability and shame, Netflix special called Call to [place] Contreras, books such as The Gifts of Imperfection, The Gifts of Imperfect Parenting, Daring Greatly, and Rising Strong, and podcast called Unlocking Us. 9. Consider creating a vision board to maintain focus on your goals  10. Consider listening The Happiness Lab podcast  11. Make an effort to hold your urges to make things \"perfect\" in a gentle way  12. Look into work by Dr. Gustavo Neville about ADHD      Self-Guided Strategies:  1. You may wish to consider pursuing tutoring services in coursework that is challenging. 2. You are encouraged to use strategies to reduce the visual demands of the information in order to make the assignments appear less overwhelming. For example, from computer-based assignments, you may wish to use the \"zoom in\" function, or for paper-based assignments, you may wish to use a blank sheet of paper to cover items so that current problem is of central focus.   3. You may wish to explore the utility of a device such as a School Places Smart Pen and to such as memory and motivation. Engaging in a repertoire of healthy living habits can serve as a helpful method of supporting overall daily functioning. 2. Adequate sleep and rest are likely to help improve attention, memory, and emotional regulation. The [facility] recommends that young adults obtain 7-9 hours of sleep per night. 3. It is recommended to avoid use of a computer, video game system, or television for the hour before going to sleep. Instead, reading a book or listening to an audio recording may be helpful to wind down at the end of the day. 4. As much as possible, you are encouraged to maintain a structured or consistent schedule within daily activities. You may find it helpful to keep a daily or weekly schedule to assist with establishing structure in your day. You should try to break larger projects (including studying for exams) into smaller, more manageable deadlines. It is important to schedule time for hobbies and time to relax in addition to academics or daily applications. 5. You may wish to take short, scheduled breaks while completing challenging tasks or activities. When possible, these breaks should involve some physical movement, such as walking to another room to obtain a drink of water. 6. You are encouraged to use a stopwatch/timer when completing nonpreferred tasks. In using a timer, you may prioritize the activity/chore until the timer \"goes off,\" and then may take a break before returning to the task for another designated period of time. The 30/30 phone kannan is a helpful tool and tracking work and break times. 7. You are encouraged to keep commonly used items in the same place each day, to improve organization and order. 8. You are also encouraged to keep several bins (organized by category or importance) near your desk within important paperwork.   Scanning important documents and keeping them saved in organized computer folders can also be helpful. 9. Upon medical clearance from your primary care physician, you may wish to explore the stress management benefits of yoga. For more information about the mind-body benefits of yoga, visit: [facility]  10. It is important for you to reach out to friends and family for support. You may wish to keep a list of names and phone numbers of trusted individuals you can talk to in times of stress. Resources: You are encouraged to refer to the following resources for information:  1. The Smart but Scattered Guide to Success by Heide Crabtree and Butch Junior  2. Driven to Distraction by Henri Alcazar and Raghu Bucoi  3. Smart but Stuck: Emotions in Teens and Adults with ADHD by Smita Kelsey  4. ADDitude Howell: [employer]. com  5. Understood Website: http://Temptster/. org/

## 2021-12-03 ENCOUNTER — OFFICE VISIT (OUTPATIENT)
Dept: FAMILY MEDICINE CLINIC | Age: 36
End: 2021-12-03
Payer: COMMERCIAL

## 2021-12-03 VITALS
TEMPERATURE: 97.7 F | HEART RATE: 101 BPM | DIASTOLIC BLOOD PRESSURE: 74 MMHG | SYSTOLIC BLOOD PRESSURE: 104 MMHG | OXYGEN SATURATION: 98 % | WEIGHT: 204 LBS | HEIGHT: 65 IN | BODY MASS INDEX: 33.99 KG/M2

## 2021-12-03 DIAGNOSIS — F90.2 ADHD (ATTENTION DEFICIT HYPERACTIVITY DISORDER), COMBINED TYPE: Primary | ICD-10-CM

## 2021-12-03 DIAGNOSIS — Z00.00 WELL ADULT EXAM: ICD-10-CM

## 2021-12-03 LAB
BASOPHILS ABSOLUTE: 0.1 K/UL (ref 0–0.2)
BASOPHILS RELATIVE PERCENT: 0.9 %
EOSINOPHILS ABSOLUTE: 0.9 K/UL (ref 0–0.6)
EOSINOPHILS RELATIVE PERCENT: 11.8 %
HCT VFR BLD CALC: 38.7 % (ref 36–48)
HEMOGLOBIN: 12.8 G/DL (ref 12–16)
LYMPHOCYTES ABSOLUTE: 2.1 K/UL (ref 1–5.1)
LYMPHOCYTES RELATIVE PERCENT: 26.2 %
MCH RBC QN AUTO: 28.8 PG (ref 26–34)
MCHC RBC AUTO-ENTMCNC: 33 G/DL (ref 31–36)
MCV RBC AUTO: 87.1 FL (ref 80–100)
MONOCYTES ABSOLUTE: 0.5 K/UL (ref 0–1.3)
MONOCYTES RELATIVE PERCENT: 6.3 %
NEUTROPHILS ABSOLUTE: 4.4 K/UL (ref 1.7–7.7)
NEUTROPHILS RELATIVE PERCENT: 54.8 %
PDW BLD-RTO: 14.7 % (ref 12.4–15.4)
PLATELET # BLD: 479 K/UL (ref 135–450)
PMV BLD AUTO: 7.8 FL (ref 5–10.5)
RBC # BLD: 4.44 M/UL (ref 4–5.2)
WBC # BLD: 8 K/UL (ref 4–11)

## 2021-12-03 PROCEDURE — 99213 OFFICE O/P EST LOW 20 MIN: CPT | Performed by: FAMILY MEDICINE

## 2021-12-03 RX ORDER — ATOMOXETINE 40 MG/1
40 CAPSULE ORAL DAILY
Qty: 30 CAPSULE | Refills: 3 | Status: SHIPPED | OUTPATIENT
Start: 2021-12-03 | End: 2022-01-03 | Stop reason: SDUPTHER

## 2021-12-03 ASSESSMENT — ENCOUNTER SYMPTOMS
FACIAL SWELLING: 0
NAUSEA: 0
SORE THROAT: 0
CONSTIPATION: 0
SHORTNESS OF BREATH: 0
PHOTOPHOBIA: 0
TROUBLE SWALLOWING: 0
COLOR CHANGE: 0
ANAL BLEEDING: 0
EYE REDNESS: 0
EYE PAIN: 0
ABDOMINAL DISTENTION: 0
CHEST TIGHTNESS: 0
BACK PAIN: 0
VOMITING: 0
WHEEZING: 0
APNEA: 0
RECTAL PAIN: 0
EYE DISCHARGE: 0
SINUS PRESSURE: 0
RHINORRHEA: 0
BLOOD IN STOOL: 0
STRIDOR: 0
COUGH: 0
ABDOMINAL PAIN: 0
VOICE CHANGE: 0
EYE ITCHING: 0
DIARRHEA: 0
CHOKING: 0

## 2021-12-03 NOTE — PROGRESS NOTES
Patient ID:Megan Mares is a 39 y.o. female. HPI  ADD/ADHD Symptoms:  Patient complains of a several year(s) history of inattention, depressed mood, anhedonia, feelings of hopelessness, anxiety, including the following: fails to give close attention to details or makes careless mistakes in school, work, or other activities, has difficulty sustaining attention in tasks or play activities, does not seem to listen when spoken to directly, has difficulty organizing tasks and activities, does not follow through on instructions and fails to finish schoolwork, chores, or duties in the workplace, loses things that are necessary for tasks and activities, is easily distracted by extraneous stimuli and is often forgetful in daily activities. She presents for evaluation of suspected ADD/ADHD. Patient denies inattention. Symptoms have been gradually worsening with time. Family history of ADD/ADHD: yes - mom. Previous treatment:has included: none. No Known Allergies    Current Outpatient Medications   Medication Sig Dispense Refill    atomoxetine (STRATTERA) 40 MG capsule Take 1 capsule by mouth daily 30 capsule 3    omeprazole (PRILOSEC) 20 MG delayed release capsule Take 1 capsule by mouth daily (with breakfast) 30 capsule 3    albuterol (PROVENTIL) (2.5 MG/3ML) 0.083% nebulizer solution Inhale 2.5 mg into the lungs      albuterol sulfate  (90 Base) MCG/ACT inhaler Inhale 2-4 puffs into the lungs       No current facility-administered medications for this visit.        Past Medical History:   Diagnosis Date    Asthma     Reactive airway disease without complication        Past Surgical History:   Procedure Laterality Date     SECTION      HYSTEROSCOPY      LAPAROSCOPY      OVARIAN CYST REMOVAL Right 2016    TUBAL LIGATION         Social History     Socioeconomic History    Marital status:      Spouse name: Not on file    Number of children: 3    Years of education: Not on file    Highest education level: Not on file   Occupational History    Occupation: [employer]    Occupation: Supervisor     Comment: [employer]. Tobacco Use    Smoking status: Never Smoker    Smokeless tobacco: Never Used   Vaping Use    Vaping Use: Never used   Substance and Sexual Activity    Alcohol use: Yes     Alcohol/week: 1.0 standard drink     Types: 1 Glasses of wine per week    Drug use: No    Sexual activity: Yes     Partners: Male   Other Topics Concern    Not on file   Social History Narrative    --raising 3 by self--ex helps    Gym 4 x week    Works 36--     Social Determinants of Health     Financial Resource Strain:     Difficulty of Paying Living Expenses: Not on file   Food Insecurity:     Worried About [employer] in the Last Year: Not on file    [employer] in the Last Year: Not on file   Transportation Needs:     Lack of Transportation (Medical): Not on file    Lack of Transportation (Non-Medical):  Not on file   Physical Activity:     Days of Exercise per Week: Not on file    Minutes of Exercise per Session: Not on file   Stress:     Feeling of Stress : Not on file   Social Connections:     Frequency of Communication with Friends and Family: Not on file    Frequency of Social Gatherings with Friends and Family: Not on file    Attends Jew Services: Not on file    Active Member of [address] [place] [address] or Organizations: Not on file    Attends Club or Organization Meetings: Not on file    Marital Status: Not on file   Intimate Partner Violence:     Fear of Current or Ex-Partner: Not on file    Emotionally Abused: Not on file    Physically Abused: Not on file    Sexually Abused: Not on file   Housing Stability:     Unable to Pay for Housing in the Last Year: Not on file    Number of [place] in the Last Year: Not on file    Unstable Housing in the Last Year: Not on file       Family History   Problem Relation Age of Onset    Depression Negative for color change, pallor, rash and wound. Neurological: Negative for dizziness, tremors, seizures, syncope, facial asymmetry, speech difficulty, weakness, light-headedness, numbness and headaches. Hematological: Negative for adenopathy. Does not bruise/bleed easily. Psychiatric/Behavioral: Positive for decreased concentration. Negative for agitation, behavioral problems, confusion, dysphoric mood, hallucinations, self-injury, sleep disturbance and suicidal ideas. The patient is nervous/anxious. The patient is not hyperactive. Objective:   Physical Exam  Physical Exam  Vitals reviewed. Constitutional:       General: She is not in acute distress. Appearance: She is well-developed. Eyes:      Conjunctiva/sclera: Conjunctivae normal.      Pupils: Pupils are equal, round, and reactive to light. Neck:      Thyroid: No thyromegaly. Vascular: No JVD. Trachea: No tracheal deviation. Cardiovascular:      Rate and Rhythm: Normal rate and regular rhythm. Heart sounds: Normal heart sounds. No murmur heard. No gallop. Pulmonary:      Effort: Pulmonary effort is normal. No respiratory distress. Breath sounds: Normal breath sounds. No stridor. No wheezing or rales. Chest:      Chest wall: No tenderness. Abdominal:      General: Bowel sounds are normal. There is no distension. Palpations: Abdomen is soft. There is no mass. Tenderness: There is no abdominal tenderness. Musculoskeletal:         General: No tenderness. Lymphadenopathy:      Cervical: No cervical adenopathy. Skin:     General: Skin is warm and dry. Coloration: Skin is not pale. Findings: No erythema or rash. Neurological:      Mental Status: She is alert and oriented to person, place, and time. Cranial Nerves: No cranial nerve deficit. Motor: No abnormal muscle tone.       Coordination: Coordination normal.      Deep Tendon Reflexes: Reflexes normal.   Psychiatric: Behavior: Behavior normal.         Thought Content:  Thought content normal.         Judgment: Judgment normal.         Assessment and Plan:     ADHD (attention deficit hyperactivity disorder), combined type   Uncontrolled, changes made today: trial of straterra--discussed SE    Well adult exam  labs

## 2021-12-04 DIAGNOSIS — D75.839 THROMBOCYTOSIS: Primary | ICD-10-CM

## 2021-12-04 LAB
A/G RATIO: 2 (ref 1.1–2.2)
ALBUMIN SERPL-MCNC: 4.6 G/DL (ref 3.4–5)
ALP BLD-CCNC: 66 U/L (ref 40–129)
ALT SERPL-CCNC: 10 U/L (ref 10–40)
ANION GAP SERPL CALCULATED.3IONS-SCNC: 16 MMOL/L (ref 3–16)
AST SERPL-CCNC: 16 U/L (ref 15–37)
BILIRUB SERPL-MCNC: <0.2 MG/DL (ref 0–1)
BUN BLDV-MCNC: 8 MG/DL (ref 7–20)
CALCIUM SERPL-MCNC: 9.3 MG/DL (ref 8.3–10.6)
CHLORIDE BLD-SCNC: 101 MMOL/L (ref 99–110)
CO2: 21 MMOL/L (ref 21–32)
CREAT SERPL-MCNC: 0.8 MG/DL (ref 0.6–1.1)
GFR AFRICAN AMERICAN: >60
GFR NON-AFRICAN AMERICAN: >60
GLUCOSE BLD-MCNC: 94 MG/DL (ref 70–99)
POTASSIUM SERPL-SCNC: 4.6 MMOL/L (ref 3.5–5.1)
SODIUM BLD-SCNC: 138 MMOL/L (ref 136–145)
TOTAL PROTEIN: 6.9 G/DL (ref 6.4–8.2)
TSH SERPL DL<=0.05 MIU/L-ACNC: 2.03 UIU/ML (ref 0.27–4.2)

## 2022-01-02 PROBLEM — Z00.00 WELL ADULT EXAM: Status: RESOLVED | Noted: 2017-09-29 | Resolved: 2022-01-02

## 2022-01-03 ENCOUNTER — VIRTUAL VISIT (OUTPATIENT)
Dept: PSYCHOLOGY | Age: 37
End: 2022-01-03
Payer: COMMERCIAL

## 2022-01-03 DIAGNOSIS — F33.42 RECURRENT MAJOR DEPRESSIVE DISORDER, IN FULL REMISSION (HCC): ICD-10-CM

## 2022-01-03 DIAGNOSIS — F90.2 ADHD (ATTENTION DEFICIT HYPERACTIVITY DISORDER), COMBINED TYPE: Primary | ICD-10-CM

## 2022-01-03 PROCEDURE — 90832 PSYTX W PT 30 MINUTES: CPT | Performed by: PSYCHOLOGIST

## 2022-01-03 RX ORDER — ATOMOXETINE 40 MG/1
40 CAPSULE ORAL DAILY
Qty: 90 CAPSULE | Refills: 3 | Status: SHIPPED | OUTPATIENT
Start: 2022-01-03

## 2022-01-03 NOTE — PROGRESS NOTES
Behavioral Health Consultation  Sheila Stewart Psy.D. Psychologist  1/3/2022  1:30-2 PM      Time spent with Patient: 30 minutes  This is patient's twentieth [facility] appointment. Reason for Consult: Depression, anxiety  Referring Provider: Caitie Morfin MD  [address] [id number]    TELEHEALTH VISIT -- Audio/Visual (During [facility])    Pursuant to the emergency declaration under the [facility], [facility] waiver authority and the [employer] Act, this Virtual Visit was conducted, with patient's consent, to reduce the patient's risk of exposure to COVID-19 and provide continuity of care for an established patient. Services were provided through a video synchronous discussion virtually to substitute for in-person clinic visit. Pt gave verbal informed consent to participate in telehealth services. Conducted a risk-benefit analysis and determined that the patient's presenting problems are consistent with the use of telepsychology. Determined that the patient has sufficient knowledge and skills in the use of technology enabling them to adequately benefit from telepsychology. It was determined that this patient was able to be properly treated without an in-person session. Patient verified that they were currently located at the [facility] address that was provided during registration.     Verified the following information:  Patient's identification: Yes  Patient location: [address] [place], [place] [id number]  Patient's call back number: [phone]  Patient's emergency contact's name and number, as well as permission to contact them if needed:   Extended Emergency Contact Information  Primary Emergency Contact: Fritsch,Rob  [facility] [place] [address] Phone: [phone]  Relation: Domestic Partner  Secondary Emergency Contact: José Manuel Quan  Address: [place], [place] [address] Phone: [phone]  Relation: Parent    Provider location: [place], [facility] [address]:  Pt reflected on the holidays. Pt is taking Straterra; no noticeable difference yet but it will build up over time. No side effects. Son's ADHD symptoms have been improving at home. Pt has been working on clearing out clutter. Making lists and shared calendars. Discussed parenting and communication styles. Planning a weekly hangout with a friend. O:  Interventions:  -Supportive techniques  -Processed recent events and stressors  -Reinforced her efforts toward self-care and cognitive reappraisal  -Discussed her reaction to [employer]  -Highlighted areas of progress      A:  MSE:  Appearance: good hygiene  and appropriate attire  Attitude: cooperative and friendly  Consciousness: alert  Orientation: oriented to person, place, time, general circumstance  Memory: recent and remote memory intact  Attention/Concentration: intact during session  Psychomotor Activity: normal  Eye Contact: normal  Speech: normal rate and volume, well-articulated  Mood: euthymic  Affect: congruent  Perception: within normal limits  Thought Content: within normal limits  Thought Process: logical, coherent and goal-directed  Insight: good  Judgment: intact  Ability to understand instructions: Yes  Ability to respond meaningfully: Yes  Morbid Ideation: no   Suicide Assessment: no suicidal ideation, plan, or intent. Appropriate for outpatient / telehealth care at this time. Homicidal Ideation: no      PHQ Scores 4/4/2021 5/4/2020 1/11/2019 9/29/2017   PHQ2 Score 1 0 0 0   PHQ9 Score 1 0 0 0     Interpretation of Total Score Depression Severity: 1-4 = Minimal depression, 5-9 = Mild depression, 10-14 = Moderate depression, 15-19 = Moderately severe depression, 20-27 = Severe depression    Diagnosis:    1.  ADHD (attention deficit hyperactivity disorder), combined type    2. Recurrent major depressive disorder, in full remission [facility])        Patient Active Problem List   Diagnosis    Left wrist pain    Reactive airway disease without complication    Severe headache    Neck pain    Bilateral hand numbness    Lymphadenopathy of head and neck    Thrombocytosis    Pain of both hip joints    Peripheral tear of medial meniscus of left knee as current injury    Urinary frequency    Depression with anxiety    Anxiety    Memory loss of unknown cause    ADHD (attention deficit hyperactivity disorder), combined type         Plan:  Pt interventions:  Supportive techniques, Emphasized self-care as important for managing overall health, Cognitive strategies to target balanced thinking and Discussed psychotropic medications. Pt Behavioral Change Plan:  Pt set the following goals:  1. Practice being mindful - paying attention to the present moment on purpose and in a nonjudgmental way  2. Practice diaphragmatic breathing throughout the day  3. Practice grounding exercises - noticing what your senses are experiencing in the moment  4. When you feel overwhelmed by emotions, start by naming what you're feeling (e.g., physical sensations, emotions). Then focus on using breathing and movement to shift your nervous system to a calmer place. 5. Learn more about self-compassion at www.self-compassion.org. Watch the video on self-compassion vs self-esteem. 6. When you notice negative self-talk, work on positive self-talk by telling yourself whatever you would tell someone closest to you  7. Try guided meditation using an kannan like Head Space, Calm, or Ten Percent Happier: Meditation for the [employer]  8.  Consider learning more about Kita Servin's work - MADELINE talks on vulnerability and shame, Netflix special called Call to Nery Contreras, books such as The Gifts of Imperfection, The Gifts of Imperfect Parenting, Daring Greatly, and Rising Strong, and podcast called Unlocking Us. 9. Consider creating a vision board to maintain focus on your goals  10. Consider listening The Happiness Lab podcast  11. Make an effort to hold your urges to make things \"perfect\" in a gentle way  12. Look into work by Dr. Marguerite Barboza about ADHD    Pt scheduled a F/U virtual visit.

## 2022-01-03 NOTE — PATIENT INSTRUCTIONS
Goals: 1. Practice being mindful - paying attention to the present moment on purpose and in a nonjudgmental way  2. Practice diaphragmatic breathing throughout the day  3. Practice grounding exercises - noticing what your senses are experiencing in the moment  4. When you feel overwhelmed by emotions, start by naming what you're feeling (e.g., physical sensations, emotions). Then focus on using breathing and movement to shift your nervous system to a calmer place. 5. Learn more about self-compassion at www.self-compassion.org. Watch the video on self-compassion vs self-esteem. 6. When you notice negative self-talk, work on positive self-talk by telling yourself whatever you would tell someone closest to you  7. Try guided meditation using an kannan like Head Space, Calm, or Ten Percent Happier: Meditation for the [employer]  8. Consider learning more about Alfreda Servin's work - MADELINE talks on vulnerability and shame, Netflix special called Call to Nery Contreras, books such as The Gifts of Imperfection, The Gifts of Imperfect Parenting, Daring Greatly, and Rising Strong, and podcast called Unlocking Us. 9. Consider creating a vision board to maintain focus on your goals  10. Consider listening The Happiness Lab podcast  11. Make an effort to hold your urges to make things \"perfect\" in a gentle way  12.  Look into work by Dr. Vikas Luciano about ADHD

## 2022-02-28 ENCOUNTER — TELEMEDICINE (OUTPATIENT)
Dept: PSYCHOLOGY | Age: 37
End: 2022-02-28
Payer: COMMERCIAL

## 2022-02-28 DIAGNOSIS — F90.2 ADHD (ATTENTION DEFICIT HYPERACTIVITY DISORDER), COMBINED TYPE: Primary | ICD-10-CM

## 2022-02-28 DIAGNOSIS — F33.42 RECURRENT MAJOR DEPRESSIVE DISORDER, IN FULL REMISSION (HCC): ICD-10-CM

## 2022-02-28 PROCEDURE — 90832 PSYTX W PT 30 MINUTES: CPT | Performed by: PSYCHOLOGIST

## 2022-02-28 NOTE — PATIENT INSTRUCTIONS
Goals: 1. Practice being mindful - paying attention to the present moment on purpose and in a nonjudgmental way  2. Practice diaphragmatic breathing throughout the day  3. Practice grounding exercises - noticing what your senses are experiencing in the moment  4. When you feel overwhelmed by emotions, start by naming what you're feeling (e.g., physical sensations, emotions). Then focus on using breathing and movement to shift your nervous system to a calmer place. 5. Learn more about self-compassion at www.self-compassion.org. Watch the video on self-compassion vs self-esteem. 6. When you notice negative self-talk, work on positive self-talk by telling yourself whatever you would tell someone closest to you  7. Try guided meditation using an kannan like Head Space, Calm, or Ten Percent Happier: Meditation for the [employer]  8. Consider learning more about Kita Servin's work - MADELINE talks on vulnerability and shame, Netflix special called Call to [place]becca Contreras, books such as The Gifts of Imperfection, The Gifts of Imperfect Parenting, Daring Greatly, and Rising Strong, and podcast called Unlocking Us. 9. Consider creating a vision board to maintain focus on your goals  10. Consider listening The Happiness Lab podcast  11. Make an effort to hold your urges to make things \"perfect\" in a gentle way  12.  Look into work by Dr. Tonia Lawler about ADHD

## 2022-02-28 NOTE — PROGRESS NOTES
Behavioral Health Consultation  Galen Vidales Psy.D. Psychologist  2/28/2022  1:30-2 PM      Time spent with Patient: 30 minutes  [facility]. Reason for Consult: Depression, anxiety  Referring Provider: Deeann Gilford, MD [place] [address] [place] [address] [id number]    TELEHEALTH VISIT -- Audio/Visual (During TQQLQ-27 public health emergency)    Pursuant to the emergency declaration under the [address] [place] [address], [facility] waiver authority and the [employer] Act, this Virtual Visit was conducted, with patient's consent, to reduce the patient's risk of exposure to COVID-19 and provide continuity of care for an established patient. Services were provided through a video synchronous discussion virtually to substitute for in-person clinic visit. Pt gave verbal informed consent to participate in telehealth services. Conducted a risk-benefit analysis and determined that the patient's presenting problems are consistent with the use of telepsychology. Determined that the patient has sufficient knowledge and skills in the use of technology enabling them to adequately benefit from telepsychology. It was determined that this patient was able to be properly treated without an in-person session. Patient verified that they were currently located at the [facility] address that was provided during registration.     Verified the following information:  Patient's identification: Yes  Patient location: [address] [place] [address]  Patient's call back number: [phone]  Patient's emergency contact's name and number, as well as permission to contact them if needed:   Extended Emergency Contact Information  Primary Emergency Contact: Fritsch,Rob  Address: [address] Phone: [phone]  Relation: Domestic Partner  Secondary Emergency Contact: José Manuel uQan  Address: [address] [place] [address] Phone: [phone]  Relation: Parent    Provider location: [place], [address]:  Pt reflected on her son's ongoing difficulty with meltdowns at night and negative self-talk, despite academic improvement. Wondering if he might benefit from therapy. Discussed family dynamics. Pt is feeling better on Strattera. Better emotional control. Has a treadmill she's been walking on.       O:  Interventions:  -Supportive techniques  -Processed recent events and stressors  -Reinforced her efforts toward self-care and cognitive reappraisal  -Discussed parenting concerns. Recommended psychotherapy for her son. -Encouraged practice of positive self-talk, both for her own wellbeing and for that of her children      A:  MSE:  Appearance: good hygiene  and appropriate attire  Attitude: cooperative and friendly  Consciousness: alert  Orientation: oriented to person, place, time, general circumstance  Memory: recent and remote memory intact  Attention/Concentration: intact during session  Psychomotor Activity: normal  Eye Contact: normal  Speech: normal rate and volume, well-articulated  Mood: euthymic  Affect: congruent  Perception: within normal limits  Thought Content: within normal limits  Thought Process: logical, coherent and goal-directed  Insight: good  Judgment: intact  Ability to understand instructions: Yes  Ability to respond meaningfully: Yes  Morbid Ideation: no   Suicide Assessment: no suicidal ideation, plan, or intent. Appropriate for outpatient / telehealth care at this time.   Homicidal Ideation: no      PHQ Scores 4/4/2021 5/4/2020 1/11/2019 9/29/2017   PHQ2 Score 1 0 0 0   PHQ9 Score 1 0 0 0     Interpretation of Total Score Depression Severity: 1-4 = Minimal depression, 5-9 = Mild depression, 10-14 = Moderate depression, 15-19 = Moderately severe depression, 20-27 = Severe depression    Diagnosis:    1. ADHD (attention deficit hyperactivity disorder), combined type    2. Recurrent major depressive disorder, in full remission [facility])        Patient Active Problem List   Diagnosis    Left wrist pain    Reactive airway disease without complication    Severe headache    Neck pain    Bilateral hand numbness    Lymphadenopathy of head and neck    Thrombocytosis    Pain of both hip joints    Peripheral tear of medial meniscus of left knee as current injury    Urinary frequency    Depression with anxiety    Anxiety    Memory loss of unknown cause    ADHD (attention deficit hyperactivity disorder), combined type         Plan:  Pt interventions:  Supportive techniques, Emphasized self-care as important for managing overall health and Cognitive strategies to target balanced thinking. Pt Behavioral Change Plan:  Pt set the following goals:  1. Practice being mindful - paying attention to the present moment on purpose and in a nonjudgmental way  2. Practice diaphragmatic breathing throughout the day  3. Practice grounding exercises - noticing what your senses are experiencing in the moment  4. When you feel overwhelmed by emotions, start by naming what you're feeling (e.g., physical sensations, emotions). Then focus on using breathing and movement to shift your nervous system to a calmer place. 5. Learn more about self-compassion at www.self-compassion.org. Watch the video on self-compassion vs self-esteem. 6. When you notice negative self-talk, work on positive self-talk by telling yourself whatever you would tell someone closest to you  7. Try guided meditation using an kannan like Head Space, Calm, or Ten Percent Happier: Meditation for the [employer]  8.  Consider learning more about Joseluis Servin's work - MADELINE talks on vulnerability and shame, [employer] special called Call to Nery Contreras, books such as The Gifts of Imperfection, The Gifts of Imperfect Parenting, Daring Greatly, and Rising Strong, and podcast called Unlocking Us. 9. Consider creating a vision board to maintain focus on your goals  10. Consider listening The Happiness Lab podcast  11. Make an effort to hold your urges to make things \"perfect\" in a gentle way  12. Look into work by Dr. Nydia Gomez about ADHD    Pt scheduled a F/U virtual visit.

## 2022-03-29 ENCOUNTER — TELEMEDICINE (OUTPATIENT)
Dept: PSYCHOLOGY | Age: 37
End: 2022-03-29

## 2022-03-29 DIAGNOSIS — F33.42 RECURRENT MAJOR DEPRESSIVE DISORDER, IN FULL REMISSION (HCC): Primary | ICD-10-CM

## 2022-03-29 DIAGNOSIS — F90.2 ADHD (ATTENTION DEFICIT HYPERACTIVITY DISORDER), COMBINED TYPE: ICD-10-CM

## 2022-03-29 NOTE — PATIENT INSTRUCTIONS
Goals: 1. Practice being mindful - paying attention to the present moment on purpose and in a nonjudgmental way  2. Practice diaphragmatic breathing throughout the day  3. Practice grounding exercises - noticing what your senses are experiencing in the moment  4. When you feel overwhelmed by emotions, start by naming what you're feeling (e.g., physical sensations, emotions). Then focus on using breathing and movement to shift your nervous system to a calmer place. 5. Learn more about self-compassion at www.self-compassion.org. Watch the video on self-compassion vs self-esteem. 6. When you notice negative self-talk, work on positive self-talk by telling yourself whatever you would tell someone closest to you  7. Try guided meditation using an kannan like Head Space, Calm, or Ten Percent Happier: Meditation for the [employer]  8. Consider learning more about Malik Servin's work - MADELINE talks on vulnerability and shame, Netflix special called Call to [facility], books such as The Gifts of Imperfection, The Gifts of Imperfect Parenting, Daring Greatly, and Rising Strong, and podcast called Unlocking Us. 9. Consider creating a vision board to maintain focus on your goals  10. Consider listening The Happiness Lab podcast  11. Make an effort to hold your urges to make things \"perfect\" in a gentle way  12.  Look into work by Dr. Hannah Kaufman about ADHD

## 2022-03-29 NOTE — PROGRESS NOTES
Emergency Contact: José Manuel Quan  Address: [address] Phone: [phone]  Relation: Parent    Provider location: [place]:  Pt is pleased that her son is now connected with a counselor at school, and he will be seeing a therapist there. As a result, son is communicating more effectively at home. Pt's reactions have improved overall. Pt is usually a planner but is trying to be more spontaneous. O:  Interventions:  -Supportive techniques  -Processed recent events and stressors  -Reinforced her efforts toward self-care and cognitive reappraisal  -Highlighted areas of progress  -Discussed parenting and blended family      A:  MSE:  Appearance: good hygiene  and appropriate attire  Attitude: cooperative and friendly  Consciousness: alert  Orientation: oriented to person, place, time, general circumstance  Memory: recent and remote memory intact  Attention/Concentration: intact during session  Psychomotor Activity: normal  Eye Contact: normal  Speech: normal rate and volume, well-articulated  Mood: euthymic  Affect: congruent  Perception: within normal limits  Thought Content: within normal limits  Thought Process: logical, coherent and goal-directed  Insight: good  Judgment: intact  Ability to understand instructions: Yes  Ability to respond meaningfully: Yes  Morbid Ideation: no   Suicide Assessment: no suicidal ideation, plan, or intent. Appropriate for outpatient / telehealth care at this time. Homicidal Ideation: no      PHQ Scores 4/4/2021 5/4/2020 1/11/2019 9/29/2017   PHQ2 Score 1 0 0 0   PHQ9 Score 1 0 0 0     Interpretation of Total Score Depression Severity: 1-4 = Minimal depression, 5-9 = Mild depression, 10-14 = Moderate depression, 15-19 = Moderately severe depression, 20-27 = Severe depression    Diagnosis:    1. Recurrent major depressive [facility]    2.  ADHD (attention deficit hyperactivity disorder), combined type        Patient Active Problem List   Diagnosis    Left wrist pain    Reactive airway disease without complication    Severe headache    Neck pain    Bilateral hand numbness    Lymphadenopathy of head and neck    Thrombocytosis    Pain of both hip joints    Peripheral tear of medial meniscus of left knee as current injury    Urinary frequency    Depression with anxiety    Anxiety    Memory loss of unknown cause    ADHD (attention deficit hyperactivity disorder), combined type         Plan:  Pt interventions:  Supportive techniques, Emphasized self-care as important for managing overall health and Cognitive strategies to target balanced thinking. Pt Behavioral Change Plan:  Pt set the following goals:  1. Practice being mindful - paying attention to the present moment on purpose and in a nonjudgmental way  2. Practice diaphragmatic breathing throughout the day  3. Practice grounding exercises - noticing what your senses are experiencing in the moment  4. When you feel overwhelmed by emotions, start by naming what you're feeling (e.g., physical sensations, emotions). Then focus on using breathing and movement to shift your nervous system to a calmer place. 5. Learn more about self-compassion at www.self-compassion.org. Watch the video on self-compassion vs self-esteem. 6. When you notice negative self-talk, work on positive self-talk by telling yourself whatever you would tell someone closest to you  7. Try guided meditation using an kannan like Head Space, Calm, or Ten Percent Happier: Meditation for the [employer]  8. Consider learning more about Tiffanie Servin's work - MADELINE talks on vulnerability and shame, Netflix special called Call to [facility], books such as The Gifts of Imperfection, The Gifts of Imperfect Parenting, Daring Greatly, and Rising Strong, and podcast called Unlocking Us. 9. Consider creating a vision board to maintain focus on your goals  10.  Consider listening The Happiness Lab podcast  11. Make an effort to hold your urges to make things \"perfect\" in a gentle way  12. Look into work by Dr. Mateo Portillo about ADHD    Pt scheduled a F/U virtual visit.

## 2022-05-05 ENCOUNTER — TELEMEDICINE (OUTPATIENT)
Dept: PSYCHOLOGY | Age: 37
End: 2022-05-05
Payer: COMMERCIAL

## 2022-05-05 DIAGNOSIS — F33.42 RECURRENT MAJOR DEPRESSIVE DISORDER, IN FULL REMISSION (HCC): ICD-10-CM

## 2022-05-05 DIAGNOSIS — F90.2 ADHD (ATTENTION DEFICIT HYPERACTIVITY DISORDER), COMBINED TYPE: Primary | ICD-10-CM

## 2022-05-05 PROCEDURE — 90832 PSYTX W PT 30 MINUTES: CPT | Performed by: PSYCHOLOGIST

## 2022-05-05 NOTE — PATIENT INSTRUCTIONS
Goals: 1. Practice being mindful - paying attention to the present moment on purpose and in a nonjudgmental way  2. Practice diaphragmatic breathing throughout the day  3. Practice grounding exercises - noticing what your senses are experiencing in the moment  4. When you feel overwhelmed by emotions, start by naming what you're feeling (e.g., physical sensations, emotions). Then focus on using breathing and movement to shift your nervous system to a calmer place. 5. Learn more about self-compassion at www.self-compassion.org. Watch the video on self-compassion vs self-esteem. 6. When you notice negative self-talk, work on positive self-talk by telling yourself whatever you would tell someone closest to you  7. Try guided meditation using an kannan like Head Space, Calm, or Ten Percent Happier: Meditation for the [employer]  8. Consider learning more about Leslee Servin's work - MADELINE talks on vulnerability and shame, Netflix special called Call to [place] Contreras, books such as The Gifts of Imperfection, The Gifts of Imperfect Parenting, Daring Greatly, and Rising Strong, and podcast called Unlocking Us. 9. Consider creating a vision board to maintain focus on your goals  10. Consider listening The Happiness Lab podcast  11. Make an effort to hold your urges to make things \"perfect\" in a gentle way  12. Look into work by Dr. Dottie Anne about ADHD  13. Consider reading Living With Your Body and Other Things You Hate by Nighat and Freddie  14.  Consider a trial of Noom

## 2022-05-05 NOTE — PROGRESS NOTES
Behavioral Health Consultation  Eren Flores Psy.D. Psychologist  5/5/2022  2-2:30 PM      Time spent with Patient: 30 minutes  This is patient's twenty-second [facility] appointment. Reason for Consult: Depression, anxiety  Referring Provider: Aleida Waggoner MD  [address] [place] [id number]    TELEHEALTH VISIT -- Audio/Visual (During TMDNP-07 public health emergency)    Pursuant to the emergency declaration under the [facility], [facility] waiver authority and the [employer] Act, this Virtual Visit was conducted, with patient's consent, to reduce the patient's risk of exposure to COVID-19 and provide continuity of care for an established patient. Services were provided through a video synchronous discussion virtually to substitute for in-person clinic visit. Pt gave verbal informed consent to participate in telehealth services. Conducted a risk-benefit analysis and determined that the patient's presenting problems are consistent with the use of telepsychology. Determined that the patient has sufficient knowledge and skills in the use of technology enabling them to adequately benefit from telepsychology. It was determined that this patient was able to be properly treated without an in-person session. Patient verified that they were currently located at the [facility] address that was provided during registration.     Verified the following information:  Patient's identification: Yes  Patient location: [address] [place], [place] [address]R[place] Paredes [address]  Patient's call back number: [phone]  Patient's emergency contact's name and number, as well as permission to contact them if needed:   Extended Emergency Contact Information  Primary Emergency Contact: Fritsch,Rob  Address: [place] [address] [place] [address] Phone: [phone]  Relation: Domestic Partner  Secondary Emergency Contact: José Manuel Quan  Address: [facility] [place] [address] Phone: [phone]  Relation: Parent    Provider location: [employer] :  Pt shared distress because her daughter Radha Biggs has been hearing voices putting her down and telling her to hurt herself. Dtr's PCP ordered an MRI. Waiting for her to get in to see a therapist. Although pt's half-brother has schizophrenia, pt is doing her best not to worry. Pt's self-care could be better. Pt has been writing things down. Not getting to the gym as much, but moving at [employer]. Discussed her ongoing desire to lose weight. O:  Interventions:  -Supportive techniques  -Processed recent events and stressors  -Reinforced her efforts toward self-care and cognitive reappraisal  -Recommended Noom to focus on weight loss  -Recommended reading Living With Your Body and Other Things You Hate by Devendra      A:  MSE:  Appearance: good hygiene  and appropriate attire  Attitude: cooperative and friendly  Consciousness: alert  Orientation: oriented to person, place, time, general circumstance  Memory: recent and remote memory intact  Attention/Concentration: intact during session  Psychomotor Activity: normal  Eye Contact: normal  Speech: normal rate and volume, well-articulated  Mood: mildly anxious  Affect: congruent  Perception: within normal limits  Thought Content: within normal limits  Thought Process: logical, coherent and goal-directed  Insight: good  Judgment: intact  Ability to understand instructions: Yes  Ability to respond meaningfully: Yes  Morbid Ideation: no   Suicide Assessment: no suicidal ideation, plan, or intent. Appropriate for outpatient / telehealth care at this time.   Homicidal Ideation: no      PHQ Scores 4/4/2021 5/4/2020 1/11/2019 9/29/2017   PHQ2 Score 1 0 0 0   PHQ9 Score 1 0 0 0     Interpretation of Total Score Depression Severity: 1-4 = Minimal depression, 5-9 = Mild depression, 10-14 = Moderate depression, 15-19 = Moderately severe depression, 20-27 = Severe depression    Diagnosis:    1. ADHD (attention deficit hyperactivity disorder), combined type    2. Recurrent major depressive disorder, in full remission [facility])        Patient Active Problem List   Diagnosis    Left wrist pain    Reactive airway disease without complication    Severe headache    Neck pain    Bilateral hand numbness    Lymphadenopathy of head and neck    Thrombocytosis    Pain of both hip joints    Peripheral tear of medial meniscus of left knee as current injury    Urinary frequency    Depression with anxiety    Anxiety    Memory loss of unknown cause    ADHD (attention deficit hyperactivity disorder), combined type         Plan:  Pt interventions:  Supportive techniques, Provided Psychoeducation re: see above, Emphasized self-care as important for managing overall health, Cognitive strategies to target balanced thinking and Provided pt book recommendation. Pt Behavioral Change Plan:  Pt set the following goals:  1. Practice being mindful - paying attention to the present moment on purpose and in a nonjudgmental way  2. Practice diaphragmatic breathing throughout the day  3. Practice grounding exercises - noticing what your senses are experiencing in the moment  4. When you feel overwhelmed by emotions, start by naming what you're feeling (e.g., physical sensations, emotions). Then focus on using breathing and movement to shift your nervous system to a calmer place. 5. Learn more about self-compassion at www.self-compassion.org. Watch the video on self-compassion vs self-esteem. 6. When you notice negative self-talk, work on positive self-talk by telling yourself whatever you would tell someone closest to you  7. Try guided meditation using an kannan like Head Space, Calm, or Ten Percent Happier: Meditation for the [employer]  8.  Consider learning more about Leann Servin's work - MADELINE talks on vulnerability and shame, Netflix special called Call to Courage, books such as The Gifts of Imperfection, The Gifts of Imperfect Parenting, Daring Greatly, and Rising Strong, and podcast called Unlocking Us. 9. Consider creating a vision board to maintain focus on your goals  10. Consider listening The Happiness Lab podcast  11. Make an effort to hold your urges to make things \"perfect\" in a gentle way  12. Look into work by Dr. Jose Barrera about ADHD    Pt scheduled a F/U virtual visit.

## 2022-09-01 ENCOUNTER — HOSPITAL ENCOUNTER (OUTPATIENT)
Dept: CT IMAGING | Age: 37
Discharge: HOME | End: 2022-09-01
Payer: COMMERCIAL

## 2022-09-01 VITALS
OXYGEN SATURATION: 100 % | TEMPERATURE: 97.2 F | HEART RATE: 77 BPM | DIASTOLIC BLOOD PRESSURE: 68 MMHG | SYSTOLIC BLOOD PRESSURE: 105 MMHG | RESPIRATION RATE: 18 BRPM

## 2022-09-01 DIAGNOSIS — D75.839 THROMBOCYTOSIS: ICD-10-CM

## 2022-09-01 LAB
APTT: 28.2 SEC (ref 23–34.3)
BASOPHILS ABSOLUTE: 0.1 K/UL (ref 0–0.2)
BASOPHILS RELATIVE PERCENT: 1 %
EOSINOPHILS ABSOLUTE: 0.5 K/UL (ref 0–0.6)
EOSINOPHILS RELATIVE PERCENT: 8.8 %
HCT VFR BLD CALC: 41.8 % (ref 36–48)
HEMOGLOBIN: 13.5 G/DL (ref 12–16)
INR BLD: 0.98 (ref 0.87–1.14)
LYMPHOCYTES ABSOLUTE: 1.8 K/UL (ref 1–5.1)
LYMPHOCYTES RELATIVE PERCENT: 32.1 %
MCH RBC QN AUTO: 28.4 PG (ref 26–34)
MCHC RBC AUTO-ENTMCNC: 32.2 G/DL (ref 31–36)
MCV RBC AUTO: 88.3 FL (ref 80–100)
MONOCYTES ABSOLUTE: 0.4 K/UL (ref 0–1.3)
MONOCYTES RELATIVE PERCENT: 6.4 %
NEUTROPHILS ABSOLUTE: 2.8 K/UL (ref 1.7–7.7)
NEUTROPHILS RELATIVE PERCENT: 51.7 %
PDW BLD-RTO: 15.1 % (ref 12.4–15.4)
PLATELET # BLD: 507 K/UL (ref 135–450)
PMV BLD AUTO: 7.6 FL (ref 5–10.5)
PROTHROMBIN TIME: 12.9 SEC (ref 11.7–14.5)
RBC # BLD: 4.73 M/UL (ref 4–5.2)
WBC # BLD: 5.5 K/UL (ref 4–11)

## 2022-09-01 PROCEDURE — 77012 CT SCAN FOR NEEDLE BIOPSY: CPT

## 2022-09-01 PROCEDURE — 2500000003 HC RX 250 WO HCPCS: Performed by: RADIOLOGY

## 2022-09-01 PROCEDURE — 88305 TISSUE EXAM BY PATHOLOGIST: CPT

## 2022-09-01 PROCEDURE — 88313 SPECIAL STAINS GROUP 2: CPT

## 2022-09-01 PROCEDURE — 85610 PROTHROMBIN TIME: CPT

## 2022-09-01 PROCEDURE — 2709999900 CT BIOPSY BONE MARROW

## 2022-09-01 PROCEDURE — 85025 COMPLETE CBC W/AUTO DIFF WBC: CPT

## 2022-09-01 PROCEDURE — 36415 COLL VENOUS BLD VENIPUNCTURE: CPT

## 2022-09-01 PROCEDURE — 7100000010 HC PHASE II RECOVERY - FIRST 15 MIN

## 2022-09-01 PROCEDURE — 85730 THROMBOPLASTIN TIME PARTIAL: CPT

## 2022-09-01 PROCEDURE — 6360000002 HC RX W HCPCS: Performed by: RADIOLOGY

## 2022-09-01 PROCEDURE — 6370000000 HC RX 637 (ALT 250 FOR IP): Performed by: RADIOLOGY

## 2022-09-01 PROCEDURE — 88341 IMHCHEM/IMCYTCHM EA ADD ANTB: CPT

## 2022-09-01 PROCEDURE — 7100000011 HC PHASE II RECOVERY - ADDTL 15 MIN

## 2022-09-01 PROCEDURE — 88311 DECALCIFY TISSUE: CPT

## 2022-09-01 PROCEDURE — 88342 IMHCHEM/IMCYTCHM 1ST ANTB: CPT

## 2022-09-01 RX ORDER — DIPHENHYDRAMINE HYDROCHLORIDE 50 MG/ML
INJECTION INTRAMUSCULAR; INTRAVENOUS
Status: COMPLETED | OUTPATIENT
Start: 2022-09-01 | End: 2022-09-01

## 2022-09-01 RX ORDER — ACETAMINOPHEN 500 MG
1000 TABLET ORAL ONCE
Status: COMPLETED | OUTPATIENT
Start: 2022-09-01 | End: 2022-09-01

## 2022-09-01 RX ORDER — FENTANYL CITRATE 50 UG/ML
INJECTION, SOLUTION INTRAMUSCULAR; INTRAVENOUS
Status: COMPLETED | OUTPATIENT
Start: 2022-09-01 | End: 2022-09-01

## 2022-09-01 RX ORDER — ACETAMINOPHEN 500 MG
TABLET ORAL
Status: DISPENSED
Start: 2022-09-01 | End: 2022-09-01

## 2022-09-01 RX ORDER — LIDOCAINE HYDROCHLORIDE 10 MG/ML
INJECTION, SOLUTION EPIDURAL; INFILTRATION; INTRACAUDAL; PERINEURAL
Status: COMPLETED | OUTPATIENT
Start: 2022-09-01 | End: 2022-09-01

## 2022-09-01 RX ORDER — MIDAZOLAM HYDROCHLORIDE 1 MG/ML
INJECTION INTRAMUSCULAR; INTRAVENOUS
Status: COMPLETED | OUTPATIENT
Start: 2022-09-01 | End: 2022-09-01

## 2022-09-01 RX ORDER — BUPIVACAINE HYDROCHLORIDE 5 MG/ML
INJECTION, SOLUTION EPIDURAL; INTRACAUDAL
Status: COMPLETED | OUTPATIENT
Start: 2022-09-01 | End: 2022-09-01

## 2022-09-01 RX ADMIN — FENTANYL CITRATE 50 MCG: 50 INJECTION, SOLUTION INTRAMUSCULAR; INTRAVENOUS at 09:45

## 2022-09-01 RX ADMIN — DIPHENHYDRAMINE HYDROCHLORIDE 25 MG: 50 INJECTION, SOLUTION INTRAMUSCULAR; INTRAVENOUS at 09:45

## 2022-09-01 RX ADMIN — LIDOCAINE HYDROCHLORIDE 10 ML: 10 INJECTION, SOLUTION EPIDURAL; INFILTRATION; INTRACAUDAL; PERINEURAL at 09:51

## 2022-09-01 RX ADMIN — BUPIVACAINE HYDROCHLORIDE 10 ML: 5 INJECTION, SOLUTION EPIDURAL; INTRACAUDAL; PERINEURAL at 09:51

## 2022-09-01 RX ADMIN — MIDAZOLAM HYDROCHLORIDE 1 MG: 2 INJECTION, SOLUTION INTRAMUSCULAR; INTRAVENOUS at 09:45

## 2022-09-01 RX ADMIN — ACETAMINOPHEN 1000 MG: 500 TABLET ORAL at 11:24

## 2022-09-01 ASSESSMENT — PAIN DESCRIPTION - PAIN TYPE: TYPE: SURGICAL PAIN

## 2022-09-01 ASSESSMENT — PAIN - FUNCTIONAL ASSESSMENT
PAIN_FUNCTIONAL_ASSESSMENT: NONE - DENIES PAIN
PAIN_FUNCTIONAL_ASSESSMENT: 0-10
PAIN_FUNCTIONAL_ASSESSMENT: PREVENTS OR INTERFERES WITH MANY ACTIVE NOT PASSIVE ACTIVITIES
PAIN_FUNCTIONAL_ASSESSMENT: 0-10
PAIN_FUNCTIONAL_ASSESSMENT: PREVENTS OR INTERFERES SOME ACTIVE ACTIVITIES AND ADLS

## 2022-09-01 ASSESSMENT — PAIN DESCRIPTION - DESCRIPTORS
DESCRIPTORS: PRESSURE
DESCRIPTORS: TENDER
DESCRIPTORS: PRESSURE
DESCRIPTORS: PRESSURE

## 2022-09-01 ASSESSMENT — PAIN SCALES - GENERAL
PAINLEVEL_OUTOF10: 4
PAINLEVEL_OUTOF10: 4

## 2022-09-01 ASSESSMENT — PAIN DESCRIPTION - ORIENTATION
ORIENTATION: LEFT
ORIENTATION: LEFT

## 2022-09-01 ASSESSMENT — PAIN DESCRIPTION - FREQUENCY: FREQUENCY: CONTINUOUS

## 2022-09-01 ASSESSMENT — PAIN DESCRIPTION - LOCATION
LOCATION: HIP
LOCATION: HIP

## 2022-09-01 NOTE — DISCHARGE INSTRUCTIONS
Discharge Instructions for Biopsy of  BONE MARROW  Interventional Radiologist performing procedure  [facility] the bandage after a day or two. May apply band-aid if needed. Diet     Resume your normal diet. Physical Activity     Rest for the first 24-48 hours. No driving for 24 hours if you have received analgesia/sedation   May shower. Do not submerge biopsy site in water (ie:bath, hot tub, swimming pool) for one week to prevent infection    Avoid strenuous activities for one week (excercise, heavy lifting etc.)       Call Your Doctor If Any of the Following Occurs   Signs of infection, including fever and chills     Redness, swelling, increasing pain, excessive bleeding, or any discharge from the incision site     Pain that you can't control with over the counter medications   Cough, shortness of breath, or chest pain     Pain when taking a deep breath     You feel your heart rate is fast   In case of an emergency, call 911 immediately. [facility]  A responsible person should be with you for the next 24 hours. MEDICATION INSTRUCTIONS:  [x]Resume your prescribed medications  [x]You may take a non-prescription headache remedy, preferably one that does not contain aspirin. [x] Keep your medication list updated and carry it with you in case of emergencies. IF YOU TAKE ANTI-COAGULANTS:  You may typically re-start your blood thinning drugs the day after your procedure UNLESS directed otherwise by the doctor who prescribes the drug for you. Some common blood thinning drugs include  Anti-inflammatory drugs (motrin, aleve)     Aspirin  Anti-coagulants such as plavix (clopidogrel) or coumadin (warfarin)      FOLLOW-UP CARE:  Follow up with your doctor for results and appointment    Physician DR Joseph [facility]      [phone]        The above instructions were reviewed with patient/significant other.   The following additional patient specific information was reviewed with the patient/significant other:       I have read and understand the instructions given to me:         ____________________________________________   (Patient/S.O. Signature)           Nurse Dwight Barbosa RN ,R.N. Date/time 9/1/2022 10:45 AM         Radiology Department  [phone]           SAME DAY SERVICES:  [phone] M-F 7AM-6PM     If you smoke STOP. We care about your health!

## 2022-09-01 NOTE — SEDATION DOCUMENTATION
IMAGING SERVICES NURSING PROGRESS NOTE    Procedure:  Bone marrow Bx  September 1, 2022  Megan Leslinancy      Allergies:  No Known Allergies    Vitals:    09/01/22 0838   BP: 113/76   Pulse: 87   Resp: 15   Temp: 98.6 °F (37 °C)   SpO2: 100%       Recent lab work reviewed with MD: yes   Procedure explained to patient by MD: yes   Informed consent obtained:yes  Family with patient: Yes    Mental Status:  Normal  Readiness to learn:  Yes  Barriers to learning: No    Pain Assessment Pre-Procedure:  Pain Present:  no  Pain Score:  0  Pain Quality/Description:  none    Time out Procedure Verification with:  [x] RN  [x] Physician  [x] Patient  [x] Other: CT Technologist  Procedure site marked, if applicable:  Yes    Note: Patient arrived A & O x 4, denies pain, breathing easily on room air, Spoke to Dr. Nallely Cabrera prior to procedure. Procedural sedation:  Fentanyl:  50 mcg  Versed:    1 mg  Benadryl: 25 mg  Post Procedureal Note:  Patient tolerated procedure well. Breathing easily on room air. Report given to [facility] RN. Patient transported in stable conditon to room 5.     Pain Assessment Post-Procedure:  Pain Present:  no  Pain Score:  0  Pain Quality/Description:  none    Plan of Care Goals:  Safety measures met:  Yes  Patient understands explanation of procedure:  Yes    Time in:  0945  Time out:  3302 Madan Hayes RN. R.N. 9/1/2022

## 2022-09-01 NOTE — H&P
Patient:  Georgia Batres   :   1985      Relevant clinical history, particularly as it involves the pending procedure, was reviewed and discussed. The procedure including risks, benefits, and alternatives was discussed at length with the patient (or designated family member) and all questions were answered. Informed consent to proceed with the procedure was given. Vital signs were monitored and documented by the Radiology nurse. Targeted physical examination  Heart : regular rate and rhythm  Lungs : clear, breathing easily  Condition : stable    [facility] nurses notes reviewed and agreed. Past Medical History:        Diagnosis Date    Asthma     Reactive airway disease without complication        Past Surgical History:           Procedure Laterality Date     SECTION      HYSTEROSCOPY      LAPAROSCOPY      OVARIAN CYST REMOVAL Right 2016    TUBAL LIGATION         Allergies:  Patient has no known allergies. Medications:   Home Meds  Current Outpatient Medications on File Prior to Encounter   Medication Sig Dispense Refill    atomoxetine (STRATTERA) 40 MG capsule Take 1 capsule by mouth daily 90 capsule 3    omeprazole (PRILOSEC) 20 MG delayed release capsule Take 1 capsule by mouth daily (with breakfast) 30 capsule 3    albuterol (PROVENTIL) (2.5 MG/3ML) 0.083% nebulizer solution Inhale 2.5 mg into the lungs      albuterol sulfate  (90 Base) MCG/ACT inhaler Inhale 2-4 puffs into the lungs       No current facility-administered medications on file prior to encounter. Current Meds  No current facility-administered medications for this encounter.         ASA 2 - Patient with mild systemic disease with no functional limitations    II (soft palate, uvula, fauces visible)    Activity:  2 - Able to move 4 extremities voluntarily on command  Respiration:  2 - Able to breathe deeply and cough freely  Circulation:  2 - BP+/- 20mmHg of normal  Consciousness:  2 - Fully awake  Oxygen Saturation (color):  2 - Able to maintain oxygen saturation >92% on room air    Sedation : Moderate sedation planned    HPI / Treatment plan : CT guided iliac bone marrow biopsy

## 2022-09-01 NOTE — PROGRESS NOTES
Arrived in SDS alert and with minimal pain at left hip. Clean lower back dressing just above gluteal fold. Ice bag placed and knees flexed. Family with her. Made aware to stay till 1200.  1045 Taking apple juice without nausea and wants food. Given box lunch. Denies need for pain medication. States BP is low for her. Advised to drink fluids. No nausea. 1050 Discharge instructions reviewed. Patient and fiance' in room educated, using the teach back method, about follow up instructions and discharge instructions. A completed copy of the AVS instructions given to patient and all questions answered. 1120 See MAR and medication for pain. Consumed 100% box lunch and taking fluids. 1200 States is feeling better after tylenol. Alert/no dizziness/feels ready to leave. [address] to car with fifrank' driving. Dressing remains clean and dry.

## 2022-09-22 LAB
Lab: NORMAL
REFERENCE RANGE: NORMAL
REPORT: NORMAL
THIS TEST SENT TO: NORMAL

## 2023-01-03 ENCOUNTER — TELEPHONE (OUTPATIENT)
Dept: PSYCHOLOGY | Age: 38
End: 2023-01-03

## 2023-01-03 NOTE — TELEPHONE ENCOUNTER
I called pt in response to her appointment request message \"my life is falling apart. \" She shared her distress about her fiance choosing to end their relationship. She is really struggling but is doing her best to function for her children. No SI, plan or intent. She is scheduled to meet with me next month. I will let her know if I have any cancellations before then.

## 2023-01-19 ENCOUNTER — TELEMEDICINE (OUTPATIENT)
Dept: PSYCHOLOGY | Age: 38
End: 2023-01-19
Payer: COMMERCIAL

## 2023-01-19 DIAGNOSIS — F43.23 ADJUSTMENT DISORDER WITH MIXED ANXIETY AND DEPRESSED MOOD: Primary | ICD-10-CM

## 2023-01-19 PROCEDURE — 90832 PSYTX W PT 30 MINUTES: CPT | Performed by: PSYCHOLOGIST

## 2023-01-19 NOTE — PATIENT INSTRUCTIONS
Goals: 1. Practice being mindful - paying attention to the present moment on purpose and in a nonjudgmental way  2. Practice diaphragmatic breathing throughout the day  3. Practice grounding exercises - noticing what your senses are experiencing in the moment  4. When you feel overwhelmed by emotions, start by naming what you're feeling (e.g., physical sensations, emotions). Then focus on using breathing and movement to shift your nervous system to a calmer place. 5. Learn more about self-compassion at www.self-compassion.org. Watch the video on self-compassion vs self-esteem. 6. When you notice negative self-talk, work on positive self-talk by telling yourself whatever you would tell someone closest to you  7. Try guided meditation using an kannan like Head Space, Calm, or Ten Percent Happier: Meditation for the [employer]  8. Consider learning more about Ernesto Servin's work - MADELINE talks on vulnerability and shame, Netflix special called Call to Bergengonzales Contreras, books such as The Gifts of Imperfection, The Gifts of Imperfect Parenting, Daring Greatly, and Rising Strong, and podcast called Unlocking Us. 9. Consider creating a vision board to maintain focus on your goals  10. Consider listening The Happiness Lab podcast  11. Make an effort to hold your urges to make things \"perfect\" in a gentle way  12.  Consider reading Scattered Minds: The Origins and Healing of Attention Deficit Disorder by Nilaysavanah FISCHERSaint Elizabeth Hebron
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
## 2023-02-08 ENCOUNTER — TELEMEDICINE (OUTPATIENT)
Dept: PSYCHOLOGY | Age: 38
End: 2023-02-08
Payer: COMMERCIAL

## 2023-02-08 DIAGNOSIS — F43.23 ADJUSTMENT DISORDER WITH MIXED ANXIETY AND DEPRESSED MOOD: Primary | ICD-10-CM

## 2023-02-08 PROCEDURE — 90832 PSYTX W PT 30 MINUTES: CPT | Performed by: PSYCHOLOGIST

## 2023-02-08 NOTE — PROGRESS NOTES
Behavioral Health Consultation  Sascha Yee Psy.D. Psychologist  2/8/2023  1-1:30 PM      Time spent with Patient: 30 minutes  This is patient's  twenty-fourth  [facility] appointment. Reason for Consult: Depression, anxiety  Referring Provider: Luis Patino MD  [address] [place] [address] [address]    TELEHEALTH VISIT -- Audio/Visual (During YDPZP-75 public health emergency)    Pursuant to the emergency declaration under the [facility], [facility] waiver authority and the [employer] Act, this Virtual Visit was conducted, with patient's consent, to reduce the patient's risk of exposure to COVID-19 and provide continuity of care for an established patient. Services were provided through a video synchronous discussion virtually to substitute for in-person clinic visit. Pt gave verbal informed consent to participate in telehealth services. Conducted a risk-benefit analysis and determined that the patient's presenting problems are consistent with the use of telepsychology. Determined that the patient has sufficient knowledge and skills in the use of technology enabling them to adequately benefit from telepsychology. It was determined that this patient was able to be properly treated without an in-person session. Patient verified that they were currently located at the [facility] address that was provided during registration.     Verified the following information:  Patient's identification: Yes  Patient location: [address] [place] [id number]  Patient's call back number: [phone]  Patient's emergency contact's name and number, as well as permission to contact them if needed:   Extended Emergency Contact Information  Primary Emergency Contact: José Manuel Quan  Address: [address]           [facility], Avonevaristo monse, [address] Phone: [phone]  Relation: Parent    Provider location: [place]      S:  Pt is feeling a bit better. Aware that she deserves better but still grieving the end of her relationship. Discussed the impact on pt and her children. She is benefiting from journaling and support from her children's father. Pt expressed concern that her son may be having seizures rather than ADHD. Also wonders about sleep apnea for him. Focused on addressing his needs. O:  Interventions:  -Supportive techniques  -Processed recent events and stressors  -Explored relationship dynamics   -Engaged in cognitive reappraisal  -Highlighted areas of progress      A:  MSE:  Appearance: good hygiene  and appropriate attire  Attitude: cooperative and friendly  Consciousness: alert  Orientation: oriented to person, place, time, general circumstance  Memory: recent and remote memory intact  Attention/Concentration: intact during session  Psychomotor Activity: normal  Eye Contact: normal  Speech: normal rate and volume, well-articulated  Mood: dysphoric, anxious  Affect: congruent, calmer  Perception: within normal limits  Thought Content: within normal limits  Thought Process: logical, coherent and goal-directed  Insight: good  Judgment: intact  Ability to understand instructions: Yes  Ability to respond meaningfully: Yes  Morbid Ideation: no   Suicide Assessment: no suicidal ideation, plan, or intent. Appropriate for outpatient / telehealth care at this time. Homicidal Ideation: no      PHQ Scores 4/4/2021 5/4/2020 1/11/2019 9/29/2017   PHQ2 Score 1 0 0 0   PHQ9 Score 1 0 0 0     Interpretation of Total Score Depression Severity: 1-4 = Minimal depression, 5-9 = Mild depression, 10-14 = Moderate depression, 15-19 = Moderately severe depression, 20-27 = Severe depression    Diagnosis:    1.  Adjustment disorder with mixed anxiety and depressed mood          Patient Active Problem List   Diagnosis    Left wrist pain    Reactive airway disease without complication    Severe headache    Neck pain    Bilateral hand numbness    Lymphadenopathy of head and neck    Thrombocytosis    Pain of both hip joints    Peripheral tear of medial meniscus of left knee as current injury    Urinary frequency    Depression with anxiety    Anxiety    Memory loss of unknown cause    ADHD (attention deficit hyperactivity disorder), combined type         Plan:  Pt interventions:  Supportive techniques, Emphasized self-care as important for managing overall health, and Cognitive strategies to target balanced thinking . Pt Behavioral Change Plan:  Pt set the following goals:  1. Practice being mindful - paying attention to the present moment on purpose and in a nonjudgmental way  2. Practice diaphragmatic breathing throughout the day  3. Practice grounding exercises - noticing what your senses are experiencing in the moment  4. When you feel overwhelmed by emotions, start by naming what you're feeling (e.g., physical sensations, emotions). Then focus on using breathing and movement to shift your nervous system to a calmer place. 5. Learn more about self-compassion at www.self-compassion.org. Watch the video on self-compassion vs self-esteem. 6. When you notice negative self-talk, work on positive self-talk by telling yourself whatever you would tell someone closest to you  7. Try guided meditation using an kannan like Head Space, Calm, or Ten Percent Happier: Meditation for the [employer]  8. Consider learning more about Paco Servin's work - MADELINE talks on vulnerability and shame, Netflix special called Call to Nery Contreras, books such as The Gifts of Imperfection, The Gifts of Imperfect Parenting, Daring Greatly, and Rising Strong, and podcast called Unlocking Us. 9. Consider creating a vision board to maintain focus on your goals  10. Consider listening The Happiness Lab podcast  11. Make an effort to hold your urges to make things \"perfect\" in a gentle way  12.  Consider reading Scattered Minds: The Origins and Healing of Attention Deficit Disorder by Nilay Mtz    Pt scheduled a F/U virtual visit.

## 2023-02-15 ENCOUNTER — OFFICE VISIT (OUTPATIENT)
Dept: FAMILY MEDICINE CLINIC | Age: 38
End: 2023-02-15
Payer: COMMERCIAL

## 2023-02-15 VITALS
DIASTOLIC BLOOD PRESSURE: 70 MMHG | HEART RATE: 69 BPM | WEIGHT: 188.2 LBS | SYSTOLIC BLOOD PRESSURE: 100 MMHG | HEIGHT: 65 IN | OXYGEN SATURATION: 98 % | BODY MASS INDEX: 31.36 KG/M2

## 2023-02-15 DIAGNOSIS — D47.3 ESSENTIAL THROMBOCYTOSIS (HCC): ICD-10-CM

## 2023-02-15 DIAGNOSIS — F98.8 ATTENTION DEFICIT DISORDER (ADD) WITHOUT HYPERACTIVITY: Primary | ICD-10-CM

## 2023-02-15 DIAGNOSIS — Z23 NEED FOR DIPHTHERIA-TETANUS-PERTUSSIS (TDAP) VACCINE: ICD-10-CM

## 2023-02-15 DIAGNOSIS — Z00.00 WELL ADULT EXAM: Primary | ICD-10-CM

## 2023-02-15 DIAGNOSIS — F90.2 ADHD (ATTENTION DEFICIT HYPERACTIVITY DISORDER), COMBINED TYPE: ICD-10-CM

## 2023-02-15 DIAGNOSIS — Z00.00 WELL ADULT EXAM: ICD-10-CM

## 2023-02-15 PROBLEM — R41.3 MEMORY LOSS OF UNKNOWN CAUSE: Status: RESOLVED | Noted: 2020-10-07 | Resolved: 2023-02-15

## 2023-02-15 PROBLEM — R59.1 LYMPHADENOPATHY OF HEAD AND NECK: Status: RESOLVED | Noted: 2018-05-18 | Resolved: 2023-02-15

## 2023-02-15 PROBLEM — R35.0 URINARY FREQUENCY: Status: RESOLVED | Noted: 2020-05-04 | Resolved: 2023-02-15

## 2023-02-15 LAB
A/G RATIO: 2 (ref 1.1–2.2)
ALBUMIN SERPL-MCNC: 4.5 G/DL (ref 3.4–5)
ALP BLD-CCNC: 56 U/L (ref 40–129)
ALT SERPL-CCNC: 7 U/L (ref 10–40)
ANION GAP SERPL CALCULATED.3IONS-SCNC: 15 MMOL/L (ref 3–16)
AST SERPL-CCNC: 13 U/L (ref 15–37)
BASOPHILS ABSOLUTE: 0.1 K/UL (ref 0–0.2)
BASOPHILS RELATIVE PERCENT: 2.1 %
BILIRUB SERPL-MCNC: 0.5 MG/DL (ref 0–1)
BUN BLDV-MCNC: 11 MG/DL (ref 7–20)
CALCIUM SERPL-MCNC: 9.4 MG/DL (ref 8.3–10.6)
CHLORIDE BLD-SCNC: 102 MMOL/L (ref 99–110)
CHOLESTEROL, TOTAL: 212 MG/DL (ref 0–199)
CO2: 22 MMOL/L (ref 21–32)
CREAT SERPL-MCNC: 0.9 MG/DL (ref 0.6–1.1)
EOSINOPHILS ABSOLUTE: 0.6 K/UL (ref 0–0.6)
EOSINOPHILS RELATIVE PERCENT: 10.9 %
GFR SERPL CREATININE-BSD FRML MDRD: >60 ML/MIN/{1.73_M2}
GLUCOSE BLD-MCNC: 95 MG/DL (ref 70–99)
HCT VFR BLD CALC: 40.4 % (ref 36–48)
HDLC SERPL-MCNC: 49 MG/DL (ref 40–60)
HEMOGLOBIN: 13.8 G/DL (ref 12–16)
LDL CHOLESTEROL CALCULATED: 147 MG/DL
LYMPHOCYTES ABSOLUTE: 1.5 K/UL (ref 1–5.1)
LYMPHOCYTES RELATIVE PERCENT: 29.4 %
MCH RBC QN AUTO: 29.8 PG (ref 26–34)
MCHC RBC AUTO-ENTMCNC: 34.2 G/DL (ref 31–36)
MCV RBC AUTO: 87.3 FL (ref 80–100)
MONOCYTES ABSOLUTE: 0.4 K/UL (ref 0–1.3)
MONOCYTES RELATIVE PERCENT: 8 %
NEUTROPHILS ABSOLUTE: 2.5 K/UL (ref 1.7–7.7)
NEUTROPHILS RELATIVE PERCENT: 49.6 %
PDW BLD-RTO: 15.4 % (ref 12.4–15.4)
PLATELET # BLD: 444 K/UL (ref 135–450)
PMV BLD AUTO: 8.6 FL (ref 5–10.5)
POTASSIUM SERPL-SCNC: 4.3 MMOL/L (ref 3.5–5.1)
RBC # BLD: 4.62 M/UL (ref 4–5.2)
SODIUM BLD-SCNC: 139 MMOL/L (ref 136–145)
TOTAL PROTEIN: 6.8 G/DL (ref 6.4–8.2)
TRIGL SERPL-MCNC: 79 MG/DL (ref 0–150)
TSH SERPL DL<=0.05 MIU/L-ACNC: 1.8 UIU/ML (ref 0.27–4.2)
VLDLC SERPL CALC-MCNC: 16 MG/DL
WBC # BLD: 5 K/UL (ref 4–11)

## 2023-02-15 PROCEDURE — 90715 TDAP VACCINE 7 YRS/> IM: CPT | Performed by: FAMILY MEDICINE

## 2023-02-15 PROCEDURE — 99395 PREV VISIT EST AGE 18-39: CPT | Performed by: FAMILY MEDICINE

## 2023-02-15 PROCEDURE — 90471 IMMUNIZATION ADMIN: CPT | Performed by: FAMILY MEDICINE

## 2023-02-15 RX ORDER — LISDEXAMFETAMINE DIMESYLATE 40 MG/1
40 CAPSULE ORAL DAILY
Qty: 30 CAPSULE | Refills: 0 | Status: SHIPPED
Start: 2023-02-15 | End: 2023-02-15

## 2023-02-15 RX ORDER — ASPIRIN 325 MG
325 TABLET ORAL DAILY
COMMUNITY

## 2023-02-15 RX ORDER — DEXTROAMPHETAMINE SACCHARATE, AMPHETAMINE ASPARTATE, DEXTROAMPHETAMINE SULFATE AND AMPHETAMINE SULFATE 5; 5; 5; 5 MG/1; MG/1; MG/1; MG/1
20 TABLET ORAL DAILY
Qty: 30 TABLET | Refills: 0 | Status: SHIPPED | OUTPATIENT
Start: 2023-02-15 | End: 2023-03-17

## 2023-02-15 SDOH — ECONOMIC STABILITY: FOOD INSECURITY: WITHIN THE PAST 12 MONTHS, YOU WORRIED THAT YOUR FOOD WOULD RUN OUT BEFORE YOU GOT MONEY TO BUY MORE.: NEVER TRUE

## 2023-02-15 SDOH — ECONOMIC STABILITY: FOOD INSECURITY: WITHIN THE PAST 12 MONTHS, THE FOOD YOU BOUGHT JUST DIDN'T LAST AND YOU DIDN'T HAVE MONEY TO GET MORE.: NEVER TRUE

## 2023-02-15 SDOH — ECONOMIC STABILITY: INCOME INSECURITY: HOW HARD IS IT FOR YOU TO PAY FOR THE VERY BASICS LIKE FOOD, HOUSING, MEDICAL CARE, AND HEATING?: NOT HARD AT ALL

## 2023-02-15 SDOH — ECONOMIC STABILITY: HOUSING INSECURITY
IN THE LAST 12 MONTHS, WAS THERE A TIME WHEN YOU DID NOT HAVE A STEADY PLACE TO SLEEP OR SLEPT IN A SHELTER (INCLUDING NOW)?: NO

## 2023-02-15 ASSESSMENT — ENCOUNTER SYMPTOMS
CHOKING: 0
COUGH: 0
PHOTOPHOBIA: 0
TROUBLE SWALLOWING: 0
RECTAL PAIN: 0
SHORTNESS OF BREATH: 0
SORE THROAT: 0
EYE PAIN: 0
VOICE CHANGE: 0
EYE ITCHING: 0
BLOOD IN STOOL: 0
EYE REDNESS: 0
CONSTIPATION: 0
VOMITING: 0
WHEEZING: 0
ABDOMINAL DISTENTION: 0
DIARRHEA: 0
NAUSEA: 0
ANAL BLEEDING: 0
SINUS PRESSURE: 0
STRIDOR: 0
ABDOMINAL PAIN: 0
EYE DISCHARGE: 0
APNEA: 0
RHINORRHEA: 0
CHEST TIGHTNESS: 0
COLOR CHANGE: 0
BACK PAIN: 0
FACIAL SWELLING: 0

## 2023-02-15 ASSESSMENT — PATIENT HEALTH QUESTIONNAIRE - PHQ9
SUM OF ALL RESPONSES TO PHQ QUESTIONS 1-9: 0
7. TROUBLE CONCENTRATING ON THINGS, SUCH AS READING THE NEWSPAPER OR WATCHING TELEVISION: 0
10. IF YOU CHECKED OFF ANY PROBLEMS, HOW DIFFICULT HAVE THESE PROBLEMS MADE IT FOR YOU TO DO YOUR WORK, TAKE CARE OF THINGS AT HOME, OR GET ALONG WITH OTHER PEOPLE: 0
4. FEELING TIRED OR HAVING LITTLE ENERGY: 0
SUM OF ALL RESPONSES TO PHQ9 QUESTIONS 1 & 2: 0
6. FEELING BAD ABOUT YOURSELF - OR THAT YOU ARE A FAILURE OR HAVE LET YOURSELF OR YOUR FAMILY DOWN: 0
8. MOVING OR SPEAKING SO SLOWLY THAT OTHER PEOPLE COULD HAVE NOTICED. OR THE OPPOSITE, BEING SO FIGETY OR RESTLESS THAT YOU HAVE BEEN MOVING AROUND A LOT MORE THAN USUAL: 0
5. POOR APPETITE OR OVEREATING: 0
1. LITTLE INTEREST OR PLEASURE IN DOING THINGS: 0
SUM OF ALL RESPONSES TO PHQ QUESTIONS 1-9: 0
9. THOUGHTS THAT YOU WOULD BE BETTER OFF DEAD, OR OF HURTING YOURSELF: 0
SUM OF ALL RESPONSES TO PHQ QUESTIONS 1-9: 0
SUM OF ALL RESPONSES TO PHQ QUESTIONS 1-9: 0
2. FEELING DOWN, DEPRESSED OR HOPELESS: 0
3. TROUBLE FALLING OR STAYING ASLEEP: 0

## 2023-02-15 NOTE — ASSESSMENT & PLAN NOTE
Uncontrolled, trial of vyvance   Controlled Substance Monitoring:    Acute and Chronic Pain Monitoring:   RX Monitoring 12/3/2021   Periodic Controlled Substance Monitoring Possible medication side effects, risk of tolerance/dependence & alternative treatments discussed. ;No signs of potential drug abuse or diversion identified. ;Assessed functional status.

## 2023-02-15 NOTE — PROGRESS NOTES
Well Adult Note  Name: Anitra Corral Date: 2/15/2023   MRN: [id number] Sex: Female   Age: 40 y.o. Ethnicity: Non- / Non    : 1985 Race: White (non-)      Nuria Lorenzana is here for well adult exam.  History:  Cpx  Cc--concerns about ADD--strattera didn't help      Review of Systems   Constitutional:  Negative for activity change, appetite change, chills, diaphoresis, fatigue, fever and unexpected weight change. HENT:  Negative for congestion, dental problem, drooling, ear discharge, ear pain, facial swelling, hearing loss, mouth sores, nosebleeds, postnasal drip, rhinorrhea, sinus pressure, sneezing, sore throat, tinnitus, trouble swallowing and voice change. Eyes:  Negative for photophobia, pain, discharge, redness, itching and visual disturbance. Respiratory:  Negative for apnea, cough, choking, chest tightness, shortness of breath, wheezing and stridor. Cardiovascular:  Negative for chest pain, palpitations and leg swelling. Gastrointestinal:  Negative for abdominal distention, abdominal pain, anal bleeding, blood in stool, constipation, diarrhea, nausea, rectal pain and vomiting. Genitourinary:  Negative for difficulty urinating, dysuria, enuresis, flank pain, frequency, genital sores and hematuria. Musculoskeletal:  Negative for arthralgias, back pain, gait problem, joint swelling, myalgias, neck pain and neck stiffness. Skin:  Negative for color change, pallor, rash and wound. Neurological:  Negative for dizziness, tremors, seizures, syncope, facial asymmetry, speech difficulty, weakness, light-headedness, numbness and headaches. Hematological:  Negative for adenopathy. Does not bruise/bleed easily. Psychiatric/Behavioral:  Negative for agitation, behavioral problems, confusion, decreased concentration, dysphoric mood, hallucinations, self-injury, sleep disturbance and suicidal ideas. The patient is not nervous/anxious and is not hyperactive. No Known Allergies      Prior to Visit Medications    Medication Sig Taking? Authorizing Provider   aspirin 325 MG tablet Take 325 mg by mouth daily Yes Historical Provider, MD   albuterol (PROVENTIL) (2.5 MG/3ML) 0.083% nebulizer solution Inhale 2.5 mg into the lungs Yes Historical Provider, MD   albuterol sulfate  (90 Base) MCG/ACT inhaler Inhale 2-4 puffs into the lungs Yes Historical Provider, MD         Past Medical History:   Diagnosis Date    Asthma     Reactive airway disease without complication        Past Surgical History:   Procedure Laterality Date     SECTION      CT BONE MARROW BIOPSY  2022    CT BONE MARROW BIOPSY 2022 [facility] CT SCAN    HYSTEROSCOPY      LAPAROSCOPY      OVARIAN CYST REMOVAL Right 2016    TUBAL LIGATION           Family History   Problem Relation Age of Onset    Depression Mother     Heart Disease Father     Stroke Father        Social History     Tobacco Use    Smoking status: Never    Smokeless tobacco: Never   Vaping Use    Vaping Use: Never used   Substance Use Topics    Alcohol use: Yes     Alcohol/week: 1.0 standard drink     Types: 1 Glasses of wine per week    Drug use: No       Objective   /70   Pulse 69   Ht 5' 5\" (1.651 m)   Wt 188 lb 3.2 oz (85.4 kg)   LMP 12/15/2022 (Exact Date)   SpO2 98%   BMI 31.32 kg/m²   Wt Readings from Last 3 Encounters:   02/15/23 188 lb 3.2 oz (85.4 kg)   21 204 lb (92.5 kg)   10/14/20 191 lb (86.6 kg)     There were no vitals filed for this visit. Physical Exam  Vitals reviewed. Constitutional:       General: She is not in acute distress. Appearance: She is well-developed. HENT:      Head: Normocephalic. Right Ear: Tympanic membrane and ear canal normal.      Left Ear: Tympanic membrane and ear canal normal.      Nose: No rhinorrhea. Mouth/Throat:      Pharynx: No oropharyngeal exudate or posterior oropharyngeal erythema.    Eyes:      General: Right eye: No discharge. Left eye: No discharge. Conjunctiva/sclera: Conjunctivae normal.      Pupils: Pupils are equal, round, and reactive to light. Neck:      Thyroid: No thyromegaly. Vascular: No carotid bruit or JVD. Trachea: No tracheal deviation. Cardiovascular:      Rate and Rhythm: Normal rate and regular rhythm. Pulses: Normal pulses. Heart sounds: Normal heart sounds. No murmur heard. No gallop. Pulmonary:      Effort: Pulmonary effort is normal. No respiratory distress. Breath sounds: Normal breath sounds. No stridor. No wheezing or rales. Chest:      Chest wall: No tenderness. Abdominal:      General: Bowel sounds are normal. There is no distension. Palpations: Abdomen is soft. There is no mass. Tenderness: There is no abdominal tenderness. There is no right CVA tenderness, left CVA tenderness, guarding or rebound. Hernia: No hernia is present. Musculoskeletal:         General: No swelling, tenderness, deformity or signs of injury. Cervical back: Normal range of motion. No rigidity or tenderness. Right lower leg: No edema. Left lower leg: No edema. Lymphadenopathy:      Cervical: No cervical adenopathy. Skin:     General: Skin is warm and dry. Coloration: Skin is not pale. Findings: No erythema or rash. Neurological:      Mental Status: She is alert and oriented to person, place, and time. Cranial Nerves: No cranial nerve deficit. Motor: No weakness or abnormal muscle tone. Coordination: Coordination normal.      Gait: Gait normal.      Deep Tendon Reflexes: Reflexes normal.   Psychiatric:         Mood and Affect: Mood normal.         Behavior: Behavior normal.         Thought Content: Thought content normal.         Judgment: Judgment normal.         Assessment   Plan   1. Essential thrombocytosis (HCC)  Assessment & Plan:   neg genetic markers and sees Verdin--on asa  2.  ADHD (attention deficit hyperactivity disorder), combined type  Assessment & Plan:   Uncontrolled, trial of vyvance  3. Well adult exam  Assessment & Plan:  Labs and tdap          Personalized Preventive Plan   Current Health Maintenance Status  Immunization History   Administered Date(s) Administered    COVID-19, PFIZER PURPLE top, DILUTE for use, (age 15 y+), 30mcg/0.3mL 04/10/2021, 05/01/2021    DTaP 1985, 1985, 01/31/1986, 01/12/1987, 05/02/1990    Hepatitis B vaccine 08/01/2008, 09/01/2008, 02/01/2009    Influenza Virus Vaccine 10/19/2017, 10/19/2018, 11/06/2019    Influenza, FLUARIX, FLULAVAL, Sophronia Marker (age 10 mo+) AND AFLURIA, (age 1 y+), PF, 0.5mL 10/07/2020    Influenza, FLUCELVAX, (age 10 mo+), MDCK, PF, 0.5mL 10/15/2021    MMR 10/18/1986, 08/12/1997    Pneumococcal Polysaccharide (Fiouetdtd77) 12/20/2011    Polio IPV (IPOL) 1985, 1985, 01/31/1986, 01/22/1987, 05/02/1990    Tdap (Boostrix, Adacel) 08/05/2008, 01/22/2009, 09/20/2012    Varicella (Varivax) 05/09/1998        Health Maintenance   Topic Date Due    Hepatitis C screen  Never done    COVID-19 Vaccine (3 - Booster for [employer] series) 06/26/2021    Depression Monitoring  04/04/2022    Flu vaccine (1) 08/01/2022    DTaP/Tdap/Td vaccine (9 - Td or Tdap) 09/20/2022    Cervical cancer screen  05/02/2024    Hepatitis A vaccine  Aged Out    Hib vaccine  Aged Out    Meningococcal (ACWY) vaccine  Aged Out    Pneumococcal 0-64 years Vaccine  Aged Out    Varicella vaccine  Discontinued    HIV screen  Discontinued     Recommendations for Preventive Services Due: see orders and patient instructions/AVS.    No follow-ups on file.

## 2023-02-20 DIAGNOSIS — F98.8 ATTENTION DEFICIT DISORDER (ADD) WITHOUT HYPERACTIVITY: ICD-10-CM

## 2023-02-20 RX ORDER — DEXTROAMPHETAMINE SACCHARATE, AMPHETAMINE ASPARTATE, DEXTROAMPHETAMINE SULFATE AND AMPHETAMINE SULFATE 5; 5; 5; 5 MG/1; MG/1; MG/1; MG/1
20 TABLET ORAL DAILY
Qty: 30 TABLET | Refills: 0 | Status: SHIPPED | OUTPATIENT
Start: 2023-02-20 | End: 2023-03-22

## 2023-03-15 DIAGNOSIS — F98.8 ATTENTION DEFICIT DISORDER (ADD) WITHOUT HYPERACTIVITY: ICD-10-CM

## 2023-03-15 RX ORDER — DEXTROAMPHETAMINE SACCHARATE, AMPHETAMINE ASPARTATE, DEXTROAMPHETAMINE SULFATE AND AMPHETAMINE SULFATE 5; 5; 5; 5 MG/1; MG/1; MG/1; MG/1
20 TABLET ORAL DAILY
Qty: 90 TABLET | Refills: 0 | Status: SHIPPED | OUTPATIENT
Start: 2023-03-15 | End: 2023-06-13

## 2023-03-17 PROBLEM — Z00.00 WELL ADULT EXAM: Status: RESOLVED | Noted: 2017-09-29 | Resolved: 2023-03-17

## 2023-03-28 ENCOUNTER — TELEMEDICINE (OUTPATIENT)
Dept: PSYCHOLOGY | Age: 38
End: 2023-03-28
Payer: COMMERCIAL

## 2023-03-28 DIAGNOSIS — F33.0 MAJOR DEPRESSIVE DISORDER, RECURRENT EPISODE, MILD WITH ANXIOUS DISTRESS (HCC): Primary | ICD-10-CM

## 2023-03-28 PROCEDURE — 90832 PSYTX W PT 30 MINUTES: CPT | Performed by: PSYCHOLOGIST

## 2023-03-28 NOTE — PROGRESS NOTES
Daring Greatly, and Rising Strong, and podcast called Unlocking Us. 9. Consider creating a vision board to maintain focus on your goals  10. Consider listening The Happiness Lab podcast  11. Make an effort to hold your urges to make things \"perfect\" in a gentle way  12. Consider reading Scattered Minds: The Origins and Healing of Attention Deficit Disorder by Nilay Mtz    Pt scheduled a F/U virtual visit.

## 2023-05-23 ENCOUNTER — TELEMEDICINE (OUTPATIENT)
Dept: PSYCHOLOGY | Age: 38
End: 2023-05-23
Payer: COMMERCIAL

## 2023-05-23 DIAGNOSIS — F33.41 MAJOR DEPRESSIVE DISORDER, RECURRENT EPISODE, IN PARTIAL REMISSION (HCC): Primary | ICD-10-CM

## 2023-05-23 PROCEDURE — 90832 PSYTX W PT 30 MINUTES: CPT | Performed by: PSYCHOLOGIST

## 2023-05-23 NOTE — PATIENT INSTRUCTIONS
Goals: 1. Practice being mindful - paying attention to the present moment on purpose and in a nonjudgmental way  2. Practice diaphragmatic breathing throughout the day  3. Practice grounding exercises - noticing what your senses are experiencing in the moment  4. When you feel overwhelmed by emotions, start by naming what you're feeling (e.g., physical sensations, emotions). Then focus on using breathing and movement to shift your nervous system to a calmer place. 5. Learn more about self-compassion at www.self-compassion.org. Watch the video on self-compassion vs self-esteem. 6. When you notice negative self-talk, work on positive self-talk by telling yourself whatever you would tell someone closest to you  7. Try guided meditation using an kannan like Head Space, Calm, or Ten Percent Happier: Meditation for the [employer]  8. Consider learning more about Lizabeth ' Brown's work - MADELINE talks on vulnerability and shame, Netflix special called Call to Nery Contreras, books such as The Gifts of Imperfection, The Gifts of Imperfect Parenting, Daring Greatly, and Rising Strong, and podcast called Unlocking Us. 9. Consider creating a vision board to maintain focus on your goals  10. Consider listening The Happiness Lab podcast  11. Make an effort to hold your urges to make things \"perfect\" in a gentle way  12.  Consider reading Scattered Minds: The Origins and Healing of Attention Deficit Disorder by Nilaysavanah FISCHERM[facility]len[facility]

## 2023-05-23 NOTE — PROGRESS NOTES
Behavioral Health Consultation  Wallace Marcus Psy.D. Psychologist  5/23/2023  11:30 AM - 12 PM      Time spent with Patient: 30 minutes  This is patient's  twenty-sixth  [facility] appointment. Reason for Consult: Depression, anxiety  Referring Provider: Liza Cordova MD  [address] [place] [address] [address]    TELEHEALTH VISIT -- Audio/Visual (During WMNZD-02 public health emergency)    Pursuant to the emergency declaration under the [facility], [facility] waiver authority and the [employer] Act, this Virtual Visit was conducted, with patient's consent, to reduce the patient's risk of exposure to COVID-19 and provide continuity of care for an established patient. Services were provided through a video synchronous discussion virtually to substitute for in-person clinic visit. Pt gave verbal informed consent to participate in telehealth services. Conducted a risk-benefit analysis and determined that the patient's presenting problems are consistent with the use of telepsychology. Determined that the patient has sufficient knowledge and skills in the use of technology enabling them to adequately benefit from telepsychology. It was determined that this patient was able to be properly treated without an in-person session. Patient verified that they were currently located at the [facility] address that was provided during registration. Verified the following information:  Patient's identification: Yes  Patient location: [address] [place] [address]  Cari Masterson [id number]  Patient's call back number: [phone]  Patient's emergency contact's name and number, as well as permission to contact them if needed:   Extended Emergency Contact Information  [facility]  Address: [address]           Serenity Cardona, [address] Phone: [phone]  Relation: Parent   needed?

## 2023-06-29 ENCOUNTER — OFFICE VISIT (OUTPATIENT)
Dept: FAMILY MEDICINE CLINIC | Age: 38
End: 2023-06-29
Payer: COMMERCIAL

## 2023-06-29 VITALS
DIASTOLIC BLOOD PRESSURE: 74 MMHG | OXYGEN SATURATION: 98 % | BODY MASS INDEX: 28.99 KG/M2 | WEIGHT: 174 LBS | HEART RATE: 72 BPM | TEMPERATURE: 98.6 F | HEIGHT: 65 IN | SYSTOLIC BLOOD PRESSURE: 112 MMHG

## 2023-06-29 DIAGNOSIS — F98.8 ATTENTION DEFICIT DISORDER (ADD) WITHOUT HYPERACTIVITY: ICD-10-CM

## 2023-06-29 PROCEDURE — 99213 OFFICE O/P EST LOW 20 MIN: CPT | Performed by: NURSE PRACTITIONER

## 2023-06-29 RX ORDER — DEXTROAMPHETAMINE SACCHARATE, AMPHETAMINE ASPARTATE, DEXTROAMPHETAMINE SULFATE AND AMPHETAMINE SULFATE 5; 5; 5; 5 MG/1; MG/1; MG/1; MG/1
20 TABLET ORAL DAILY
Qty: 90 TABLET | Refills: 0 | Status: SHIPPED | OUTPATIENT
Start: 2023-06-29 | End: 2023-09-27

## 2023-06-29 ASSESSMENT — ENCOUNTER SYMPTOMS
CONSTIPATION: 0
SORE THROAT: 0
BLOOD IN STOOL: 0
COUGH: 0
NAUSEA: 0
SINUS PRESSURE: 0
COLOR CHANGE: 0
VOMITING: 0
SHORTNESS OF BREATH: 0
RHINORRHEA: 0
WHEEZING: 0
EYE ITCHING: 0
ABDOMINAL PAIN: 0
BACK PAIN: 0
CHEST TIGHTNESS: 0
DIARRHEA: 0
EYE REDNESS: 0

## 2023-08-08 ENCOUNTER — TELEMEDICINE (OUTPATIENT)
Dept: PSYCHOLOGY | Age: 38
End: 2023-08-08
Payer: COMMERCIAL

## 2023-08-08 DIAGNOSIS — F33.41 MAJOR DEPRESSIVE DISORDER, RECURRENT EPISODE, IN PARTIAL REMISSION (HCC): Primary | ICD-10-CM

## 2023-08-08 PROCEDURE — 90832 PSYTX W PT 30 MINUTES: CPT | Performed by: PSYCHOLOGIST

## 2023-08-08 NOTE — PROGRESS NOTES
Behavioral Health Consultation  Radha Yanez Psy.D. Psychologist  8/8/2023  1:30 - 2 PM      Time spent with Patient: 30 minutes  This is patient's  twenty-seventh  [facility] appointment. Reason for Consult: Depression, anxiety  Referring Provider: MD Robi Gilbert [facility] [address]    TELEHEALTH VISIT -- Audio/Visual    Latoya Garre was evaluated through a synchronous (real-time) audio-video encounter using HIPAA-compliant technology. The patient (or guardian, if applicable) is aware that this is a billable service, which includes applicable co-pays. This Virtual Visit was conducted with patient's (and/or legal guardian's) consent. Patient identification was verified, and a caregiver was present when appropriate. The patient was located in a state where the provider was licensed to provide care. Conducted a risk-benefit analysis and determined that the patient's presenting problems are consistent with the use of telepsychology. Determined that the patient has sufficient knowledge and skills in the use of technology enabling them to adequately benefit from telepsychology. It was determined that this patient was able to be properly treated without an in-person session. Patient verified current location at the beginning of the visit. Verified the following information:  Patient's identification: Yes  Patient location: [address] [place] [address]  Patient's call back number: [phone]  Patient's emergency contact's name and number, as well as permission to contact them if needed:  Extended Emergency Contact Information  Primary Emergency Contact: Vivian Wheeler  Address: [address] [place] [address]           Cierra Javed  [address] [facility] Phone: [phone]  Relation: Parent   needed? No  Secondary Emergency Contact: gage herbert  [place] Phone: [phone]  Relation: Parent   needed?  No    Provider location: [place] [address]:  Pt

## 2023-08-08 NOTE — PATIENT INSTRUCTIONS
Goals: 1. Practice being mindful - paying attention to the present moment on purpose and in a nonjudgmental way  2. Practice diaphragmatic breathing throughout the day  3. Practice grounding exercises - noticing what your senses are experiencing in the moment  4. When you feel overwhelmed by emotions, start by naming what you're feeling (e.g., physical sensations, emotions). Then focus on using breathing and movement to shift your nervous system to a calmer place. 5. Learn more about self-compassion at www.self-compassion.org. Watch the video on self-compassion vs self-esteem. 6. When you notice negative self-talk, work on positive self-talk by telling yourself whatever you would tell someone closest to you  7. Try guided meditation using an kannan like Head Space, Calm, or Ten Percent Happier: Meditation for the [employer]  8. Consider learning more about Cherie Servin's work - MADELINE talks on vulnerability and shame, Netflix special called Call to [place]becca Contreras, books such as The Gifts of Imperfection, The Gifts of Imperfect Parenting, Daring Greatly, and Rising Strong, and podcast called Unlocking Us. 9. Consider creating a vision board to maintain focus on your goals  10. Consider listening The Happiness Lab podcast  11. Make an effort to hold your urges to make things \"perfect\" in a gentle way  12.  Consider reading Scattered Minds: The Origins and Healing of Attention Deficit Disorder by Nilay AALIYAHThe Medical Center

## 2023-09-14 ENCOUNTER — OFFICE VISIT (OUTPATIENT)
Dept: FAMILY MEDICINE CLINIC | Age: 38
End: 2023-09-14
Payer: COMMERCIAL

## 2023-09-14 VITALS
WEIGHT: 174.4 LBS | HEART RATE: 93 BPM | SYSTOLIC BLOOD PRESSURE: 108 MMHG | OXYGEN SATURATION: 99 % | BODY MASS INDEX: 29.02 KG/M2 | DIASTOLIC BLOOD PRESSURE: 74 MMHG

## 2023-09-14 DIAGNOSIS — F90.2 ADHD (ATTENTION DEFICIT HYPERACTIVITY DISORDER), COMBINED TYPE: Primary | ICD-10-CM

## 2023-09-14 DIAGNOSIS — R55 SYNCOPE AND COLLAPSE: ICD-10-CM

## 2023-09-14 DIAGNOSIS — R00.2 PALPITATIONS: ICD-10-CM

## 2023-09-14 LAB
ALBUMIN SERPL-MCNC: 4.8 G/DL (ref 3.4–5)
ALBUMIN/GLOB SERPL: 2 {RATIO} (ref 1.1–2.2)
ALP SERPL-CCNC: 57 U/L (ref 40–129)
ALT SERPL-CCNC: 6 U/L (ref 10–40)
ANION GAP SERPL CALCULATED.3IONS-SCNC: 12 MMOL/L (ref 3–16)
AST SERPL-CCNC: 13 U/L (ref 15–37)
BILIRUB SERPL-MCNC: 0.4 MG/DL (ref 0–1)
BUN SERPL-MCNC: 9 MG/DL (ref 7–20)
CALCIUM SERPL-MCNC: 9.2 MG/DL (ref 8.3–10.6)
CHLORIDE SERPL-SCNC: 105 MMOL/L (ref 99–110)
CO2 SERPL-SCNC: 22 MMOL/L (ref 21–32)
CREAT SERPL-MCNC: 0.9 MG/DL (ref 0.6–1.1)
GFR SERPLBLD CREATININE-BSD FMLA CKD-EPI: >60 ML/MIN/{1.73_M2}
GLUCOSE SERPL-MCNC: 93 MG/DL (ref 70–99)
POTASSIUM SERPL-SCNC: 4.5 MMOL/L (ref 3.5–5.1)
PROT SERPL-MCNC: 7.2 G/DL (ref 6.4–8.2)
SODIUM SERPL-SCNC: 139 MMOL/L (ref 136–145)

## 2023-09-14 PROCEDURE — 99214 OFFICE O/P EST MOD 30 MIN: CPT | Performed by: STUDENT IN AN ORGANIZED HEALTH CARE EDUCATION/TRAINING PROGRAM

## 2023-09-14 NOTE — PROGRESS NOTES
Chief Complaint   Patient presents with    Dizziness     Passed out last week but had symptoms prior to this episode          HPI: Radha Seals is a 45 y.o. female who presents for passing out    #Syncope  - Pt was at an [employer] soccer game, fell to the ground, was taken to the medical bay, BP checked out and glucose checked, had feelings of lightheadedness prior to this episode and had tunnel vision, had one prior near syncope a week prior, pt feels that she did not hit her head,  -Pt had eaten dinner 1 hour prior, it was a hot day, states had good PO intake,  -BP at the time of the incident was 82/48, denies any current LH or dizziness, gets orthostatic hypotension at home has never passed out like this before  -Pt was not drinking alcohol the day of the incident, does not remember if had caffeine  -Glucose at the time was 122  -Denies any issues with her heart in the past, did feel like her heart was racing at the time of the incident, mother has HTN and had a stroke, did have 1 grandfather who had A Fib  -Pt has a BP cuff at home, drinks 1 cup of coffee per day   -Fall was witnessed, denies tongue biting or urinary incontinence.  Denies history of seizures     Lab Results   Component Value Date    CREATININE 0.9 02/15/2023    BUN 11 02/15/2023     02/15/2023    K 4.3 02/15/2023     02/15/2023    CO2 22 02/15/2023        #ADHD  -Takes adderall QD, no issues with the dose, tolerating it well    Past Medical History:   Diagnosis Date    Asthma     Reactive airway disease without complication        Past Surgical History:   Procedure Laterality Date     SECTION      CT BONE MARROW BIOPSY  2022    CT BONE MARROW BIOPSY 2022 [facility] CT SCAN    HYSTEROSCOPY      LAPAROSCOPY      OVARIAN CYST REMOVAL Right 2016    TUBAL LIGATION        Current Outpatient Medications   Medication Sig Dispense Refill    amphetamine-dextroamphetamine (ADDERALL, 20MG,) 20 MG tablet Take 1 tablet

## 2023-09-20 ENCOUNTER — TELEPHONE (OUTPATIENT)
Dept: CARDIOLOGY CLINIC | Age: 38
End: 2023-09-20

## 2023-09-20 NOTE — TELEPHONE ENCOUNTER
Monitor placed by [employer] of monitor 30 days  Monitor ordered by Dr. Lluvia Randall  Serial number [id number]  Kit ID   Activation successful prior to pt leaving office? Yes    Placed monitor on PT successfully. Advised of showering, recording symptoms, changing patches, charging devices, and return instructions.   PT V/U.

## 2023-10-05 ENCOUNTER — TELEMEDICINE (OUTPATIENT)
Dept: PSYCHOLOGY | Age: 38
End: 2023-10-05
Payer: COMMERCIAL

## 2023-10-05 DIAGNOSIS — F33.41 MAJOR DEPRESSIVE DISORDER, RECURRENT EPISODE, IN PARTIAL REMISSION (HCC): Primary | ICD-10-CM

## 2023-10-05 PROCEDURE — 90832 PSYTX W PT 30 MINUTES: CPT | Performed by: PSYCHOLOGIST

## 2023-10-05 NOTE — PROGRESS NOTES
Behavioral Health Consultation  Pan Guadalupe Psy.D. Psychologist  10/5/2023  1-1:30 PM    Time spent with Patient: 30 minutes  This is patient's  tw[age]h  [facility] appointment. Reason for Consult: Depression, anxiety  [facility] Provider[facility] [address]    TELEHEALTH VISIT -- Audio/Visual    Dee Chaney was evaluated through a synchronous (real-time) audio-video encounter using HIPAA-compliant technology. The patient (or guardian, if applicable) is aware that this is a billable service, which includes applicable co-pays. This Virtual Visit was conducted with patient's (and/or legal guardian's) consent. Patient identification was verified, and a caregiver was present when appropriate. The patient was located in a state where the provider was licensed to provide care. Conducted a risk-benefit analysis and determined that the patient's presenting problems are consistent with the use of telepsychology. Determined that the patient has sufficient knowledge and skills in the use of technology enabling them to adequately benefit from telepsychology. It was determined that this patient was able to be properly treated without an in-person session. Patient verified current location at the beginning of the visit. Verified the following information:  Patient's identification: Yes  Patient location: [address].  [place] [id number]  Patient's call back number: [phone]  Patient's emergency contact's name and number, as well as permission to contact them if needed:  Extended Emergency Contact Information  Primary Emergency Contact: Aline [facility]  Address: [address] [place] [address] [facility] [address] [address] [address] [place] Phone: [phone]  Relation: Parent   needed? No  Secondary Emergency Contact: gage herbert  Home Phone: [phone]  Relation: Parent   needed?  No    Provider location: [place] [address] [address]:  Pt passed

## 2023-10-23 ENCOUNTER — TELEPHONE (OUTPATIENT)
Dept: CARDIOLOGY CLINIC | Age: 38
End: 2023-10-23

## 2023-10-23 PROCEDURE — 93272 ECG/REVIEW INTERPRET ONLY: CPT | Performed by: INTERNAL MEDICINE

## 2023-10-24 DIAGNOSIS — R55 SYNCOPE AND COLLAPSE: Primary | ICD-10-CM

## 2023-10-26 ENCOUNTER — OFFICE VISIT (OUTPATIENT)
Dept: FAMILY MEDICINE CLINIC | Age: 38
End: 2023-10-26
Payer: COMMERCIAL

## 2023-10-26 VITALS
WEIGHT: 173.6 LBS | DIASTOLIC BLOOD PRESSURE: 70 MMHG | HEART RATE: 87 BPM | SYSTOLIC BLOOD PRESSURE: 100 MMHG | OXYGEN SATURATION: 98 % | BODY MASS INDEX: 28.92 KG/M2 | HEIGHT: 65 IN

## 2023-10-26 DIAGNOSIS — R55 VASOVAGAL SYNCOPE: ICD-10-CM

## 2023-10-26 DIAGNOSIS — D47.3 ESSENTIAL THROMBOCYTOSIS (HCC): ICD-10-CM

## 2023-10-26 DIAGNOSIS — F98.8 ATTENTION DEFICIT DISORDER (ADD) WITHOUT HYPERACTIVITY: ICD-10-CM

## 2023-10-26 PROBLEM — F90.2 ADHD (ATTENTION DEFICIT HYPERACTIVITY DISORDER), COMBINED TYPE: Status: RESOLVED | Noted: 2021-11-18 | Resolved: 2023-10-26

## 2023-10-26 PROBLEM — R51.9 SEVERE HEADACHE: Status: RESOLVED | Noted: 2017-12-13 | Resolved: 2023-10-26

## 2023-10-26 PROCEDURE — 99214 OFFICE O/P EST MOD 30 MIN: CPT | Performed by: FAMILY MEDICINE

## 2023-10-26 ASSESSMENT — COLUMBIA-SUICIDE SEVERITY RATING SCALE - C-SSRS
7. DID THIS OCCUR IN THE LAST THREE MONTHS: NO
3. HAVE YOU BEEN THINKING ABOUT HOW YOU MIGHT KILL YOURSELF?: NO
4. HAVE YOU HAD THESE THOUGHTS AND HAD SOME INTENTION OF ACTING ON THEM?: NO
5. HAVE YOU STARTED TO WORK OUT OR WORKED OUT THE DETAILS OF HOW TO KILL YOURSELF? DO YOU INTEND TO CARRY OUT THIS PLAN?: NO

## 2023-10-26 ASSESSMENT — PATIENT HEALTH QUESTIONNAIRE - PHQ9
1. LITTLE INTEREST OR PLEASURE IN DOING THINGS: 0
SUM OF ALL RESPONSES TO PHQ QUESTIONS 1-9: 0
6. FEELING BAD ABOUT YOURSELF - OR THAT YOU ARE A FAILURE OR HAVE LET YOURSELF OR YOUR FAMILY DOWN: 0
8. MOVING OR SPEAKING SO SLOWLY THAT OTHER PEOPLE COULD HAVE NOTICED. OR THE OPPOSITE, BEING SO FIGETY OR RESTLESS THAT YOU HAVE BEEN MOVING AROUND A LOT MORE THAN USUAL: 0
2. FEELING DOWN, DEPRESSED OR HOPELESS: 0
9. THOUGHTS THAT YOU WOULD BE BETTER OFF DEAD, OR OF HURTING YOURSELF: 0
7. TROUBLE CONCENTRATING ON THINGS, SUCH AS READING THE NEWSPAPER OR WATCHING TELEVISION: 0
4. FEELING TIRED OR HAVING LITTLE ENERGY: 0
5. POOR APPETITE OR OVEREATING: 0
SUM OF ALL RESPONSES TO PHQ QUESTIONS 1-9: 0
SUM OF ALL RESPONSES TO PHQ9 QUESTIONS 1 & 2: 0
10. IF YOU CHECKED OFF ANY PROBLEMS, HOW DIFFICULT HAVE THESE PROBLEMS MADE IT FOR YOU TO DO YOUR WORK, TAKE CARE OF THINGS AT HOME, OR GET ALONG WITH OTHER PEOPLE: 0
3. TROUBLE FALLING OR STAYING ASLEEP: 0
SUM OF ALL RESPONSES TO PHQ QUESTIONS 1-9: 0
SUM OF ALL RESPONSES TO PHQ QUESTIONS 1-9: 0

## 2023-10-26 ASSESSMENT — ENCOUNTER SYMPTOMS
NAUSEA: 0
TROUBLE SWALLOWING: 0
PHOTOPHOBIA: 0
ABDOMINAL DISTENTION: 0
COLOR CHANGE: 0
CONSTIPATION: 0
SINUS PRESSURE: 0
CHEST TIGHTNESS: 0
EYE ITCHING: 0
ANAL BLEEDING: 0
BLOOD IN STOOL: 0
EYE REDNESS: 0
ABDOMINAL PAIN: 0
SORE THROAT: 0
EYE DISCHARGE: 0
COUGH: 0
EYE PAIN: 0
APNEA: 0
VISUAL CHANGE: 0
BACK PAIN: 0
WHEEZING: 0
DIARRHEA: 0
BOWEL INCONTINENCE: 0
CHOKING: 0
STRIDOR: 0
VOICE CHANGE: 0
SHORTNESS OF BREATH: 0
RHINORRHEA: 0
VOMITING: 0
FACIAL SWELLING: 0
RECTAL PAIN: 0

## 2023-10-26 NOTE — ASSESSMENT & PLAN NOTE
Well-controlled, continue current medications   Controlled Substance Monitoring:    Acute and Chronic Pain Monitoring:   RX Monitoring Periodic Controlled Substance Monitoring   10/26/2023   9:58 AM Possible medication side effects, risk of tolerance/dependence & alternative treatments discussed. ;No signs of potential drug abuse or diversion identified. ;Assessed functional status (ability to engage in work or other purposeful activities, the pain intensity and its interference with activities of daily living, quality of family life and social activities, and the physical activity)

## 2023-10-26 NOTE — PROGRESS NOTES
tenderness. Abdominal:      General: Bowel sounds are normal. There is no distension. Palpations: Abdomen is soft. There is no mass. Tenderness: There is no abdominal tenderness. There is no right CVA tenderness, left CVA tenderness, guarding or rebound. Hernia: No hernia is present. Musculoskeletal:         General: No swelling, tenderness, deformity or signs of injury. Cervical back: Normal range of motion. No rigidity or tenderness. Right lower leg: No edema. Left lower leg: No edema. Lymphadenopathy:      Cervical: No cervical adenopathy. Skin:     General: Skin is warm and dry. Coloration: Skin is not pale. Findings: No erythema or rash. Neurological:      Mental Status: She is alert and oriented to person, place, and time. Cranial Nerves: No cranial nerve deficit. Motor: No weakness or abnormal muscle tone. Coordination: Coordination normal.      Gait: Gait normal.      Deep Tendon Reflexes: Reflexes normal.   Psychiatric:         Mood and Affect: Mood normal.         Behavior: Behavior normal.         Thought Content: Thought content normal.         Judgment: Judgment normal.         Assessment and Plan:     Essential thrombocytosis (HCC)   Unclear control, changes made today: recheck    Attention deficit disorder (ADD) without hyperactivity   Well-controlled, continue current medications   Controlled Substance Monitoring:    Acute and Chronic Pain Monitoring:   RX Monitoring Periodic Controlled Substance Monitoring   10/26/2023   9:58 AM Possible medication side effects, risk of tolerance/dependence & alternative treatments discussed. ;No signs of potential drug abuse or diversion identified. ;Assessed functional status (ability to engage in work or other purposeful activities, the pain intensity and its interference with activities of daily living, quality of family life and social activities, and the physical activity)           Vasovagal

## 2023-10-27 LAB
BASOPHILS # BLD: 0.1 K/UL (ref 0–0.2)
BASOPHILS NFR BLD: 1.2 %
DEPRECATED RDW RBC AUTO: 15.7 % (ref 12.4–15.4)
EOSINOPHIL # BLD: 0.7 K/UL (ref 0–0.6)
EOSINOPHIL NFR BLD: 10.7 %
HCT VFR BLD AUTO: 40.3 % (ref 36–48)
HGB BLD-MCNC: 13.5 G/DL (ref 12–16)
LYMPHOCYTES # BLD: 2 K/UL (ref 1–5.1)
LYMPHOCYTES NFR BLD: 28.5 %
MCH RBC QN AUTO: 30 PG (ref 26–34)
MCHC RBC AUTO-ENTMCNC: 33.5 G/DL (ref 31–36)
MCV RBC AUTO: 89.5 FL (ref 80–100)
MONOCYTES # BLD: 0.4 K/UL (ref 0–1.3)
MONOCYTES NFR BLD: 5.6 %
NEUTROPHILS # BLD: 3.7 K/UL (ref 1.7–7.7)
NEUTROPHILS NFR BLD: 54 %
PLATELET # BLD AUTO: 511 K/UL (ref 135–450)
PMV BLD AUTO: 7.9 FL (ref 5–10.5)
RBC # BLD AUTO: 4.5 M/UL (ref 4–5.2)
WBC # BLD AUTO: 6.9 K/UL (ref 4–11)

## 2023-11-01 DIAGNOSIS — F98.8 ATTENTION DEFICIT DISORDER (ADD) WITHOUT HYPERACTIVITY: ICD-10-CM

## 2023-11-02 RX ORDER — DEXTROAMPHETAMINE SACCHARATE, AMPHETAMINE ASPARTATE, DEXTROAMPHETAMINE SULFATE AND AMPHETAMINE SULFATE 5; 5; 5; 5 MG/1; MG/1; MG/1; MG/1
20 TABLET ORAL DAILY
Qty: 90 TABLET | Refills: 0 | Status: SHIPPED | OUTPATIENT
Start: 2023-11-02 | End: 2024-01-31

## 2023-11-14 ENCOUNTER — HOSPITAL ENCOUNTER (OUTPATIENT)
Dept: CARDIOLOGY | Age: 38
Discharge: HOME | End: 2023-11-14
Payer: COMMERCIAL

## 2023-11-14 DIAGNOSIS — R55 VASOVAGAL SYNCOPE: ICD-10-CM

## 2023-11-14 PROCEDURE — 93306 TTE W/DOPPLER COMPLETE: CPT

## 2024-05-31 ENCOUNTER — OFFICE VISIT (OUTPATIENT)
Dept: FAMILY MEDICINE CLINIC | Age: 39
End: 2024-05-31
Payer: COMMERCIAL

## 2024-05-31 VITALS
DIASTOLIC BLOOD PRESSURE: 80 MMHG | OXYGEN SATURATION: 98 % | SYSTOLIC BLOOD PRESSURE: 120 MMHG | HEIGHT: 65 IN | BODY MASS INDEX: 28.49 KG/M2 | WEIGHT: 171 LBS | HEART RATE: 101 BPM

## 2024-05-31 DIAGNOSIS — F98.8 ATTENTION DEFICIT DISORDER (ADD) WITHOUT HYPERACTIVITY: ICD-10-CM

## 2024-05-31 PROCEDURE — 99213 OFFICE O/P EST LOW 20 MIN: CPT | Performed by: FAMILY MEDICINE

## 2024-05-31 RX ORDER — DEXTROAMPHETAMINE SACCHARATE, AMPHETAMINE ASPARTATE, DEXTROAMPHETAMINE SULFATE AND AMPHETAMINE SULFATE 5; 5; 5; 5 MG/1; MG/1; MG/1; MG/1
TABLET ORAL
Qty: 135 TABLET | Refills: 0 | Status: SHIPPED | OUTPATIENT
Start: 2024-05-31 | End: 2024-08-31

## 2024-05-31 SDOH — ECONOMIC STABILITY: FOOD INSECURITY: WITHIN THE PAST 12 MONTHS, THE FOOD YOU BOUGHT JUST DIDN'T LAST AND YOU DIDN'T HAVE MONEY TO GET MORE.: NEVER TRUE

## 2024-05-31 SDOH — ECONOMIC STABILITY: FOOD INSECURITY: WITHIN THE PAST 12 MONTHS, YOU WORRIED THAT YOUR FOOD WOULD RUN OUT BEFORE YOU GOT MONEY TO BUY MORE.: NEVER TRUE

## 2024-05-31 SDOH — ECONOMIC STABILITY: INCOME INSECURITY: HOW HARD IS IT FOR YOU TO PAY FOR THE VERY BASICS LIKE FOOD, HOUSING, MEDICAL CARE, AND HEATING?: NOT HARD AT ALL

## 2024-05-31 ASSESSMENT — PATIENT HEALTH QUESTIONNAIRE - PHQ9
5. POOR APPETITE OR OVEREATING: NOT AT ALL
10. IF YOU CHECKED OFF ANY PROBLEMS, HOW DIFFICULT HAVE THESE PROBLEMS MADE IT FOR YOU TO DO YOUR WORK, TAKE CARE OF THINGS AT HOME, OR GET ALONG WITH OTHER PEOPLE: NOT DIFFICULT AT ALL
2. FEELING DOWN, DEPRESSED OR HOPELESS: NOT AT ALL
1. LITTLE INTEREST OR PLEASURE IN DOING THINGS: NOT AT ALL
9. THOUGHTS THAT YOU WOULD BE BETTER OFF DEAD, OR OF HURTING YOURSELF: NOT AT ALL
8. MOVING OR SPEAKING SO SLOWLY THAT OTHER PEOPLE COULD HAVE NOTICED. OR THE OPPOSITE, BEING SO FIGETY OR RESTLESS THAT YOU HAVE BEEN MOVING AROUND A LOT MORE THAN USUAL: NOT AT ALL
3. TROUBLE FALLING OR STAYING ASLEEP: NOT AT ALL
4. FEELING TIRED OR HAVING LITTLE ENERGY: NOT AT ALL
SUM OF ALL RESPONSES TO PHQ QUESTIONS 1-9: 0
SUM OF ALL RESPONSES TO PHQ9 QUESTIONS 1 & 2: 0
6. FEELING BAD ABOUT YOURSELF - OR THAT YOU ARE A FAILURE OR HAVE LET YOURSELF OR YOUR FAMILY DOWN: NOT AT ALL
7. TROUBLE CONCENTRATING ON THINGS, SUCH AS READING THE NEWSPAPER OR WATCHING TELEVISION: NOT AT ALL
SUM OF ALL RESPONSES TO PHQ QUESTIONS 1-9: 0

## 2024-05-31 NOTE — PROGRESS NOTES
Megan Quan (: 1985) is a 38 y.o. female is here for evaluation of the following chief complaint(s): Medication Check    Assessment/Plan:   1. Attention deficit disorder (ADD) without hyperactivity  -     amphetamine-dextroamphetamine (ADDERALL, 20MG,) 20 MG tablet; One in AM, 1/2 tab at 2PM, Disp-135 tablet, R-0Normal    No follow-ups on file.  Subjective/Objective:   Since last visit: probs in afternoon.  Medication compliance: weekends and school holidays off.  Side effects from medication include: none.   has been reviewed.     A comprehensive review of systems was negative.       /80   Pulse (!) 101   Ht 1.651 m (5' 5\")   Wt 77.6 kg (171 lb)   SpO2 98%   BMI 28.46 kg/m²     On this date 2024 I have spent 10 minutes reviewing previous notes, test results and face to face with the patient discussing the diagnosis and importance of compliance with the treatment plan as well as documenting on the day of the visit.  An electronic signature was used to authenticate this note.  -- Jarek Bond MD